# Patient Record
Sex: MALE | Race: WHITE | NOT HISPANIC OR LATINO | Employment: OTHER | URBAN - METROPOLITAN AREA
[De-identification: names, ages, dates, MRNs, and addresses within clinical notes are randomized per-mention and may not be internally consistent; named-entity substitution may affect disease eponyms.]

---

## 2019-07-10 ENCOUNTER — TELEPHONE (OUTPATIENT)
Dept: GASTROENTEROLOGY | Facility: CLINIC | Age: 79
End: 2019-07-10

## 2019-08-01 NOTE — TELEPHONE ENCOUNTER
Dr Harriett Huynh cannot reach this pt by phone  No current number is available-there has been no response from recall letter   Please advise-thank you

## 2020-03-02 ENCOUNTER — OFFICE VISIT (OUTPATIENT)
Dept: GASTROENTEROLOGY | Facility: CLINIC | Age: 80
End: 2020-03-02
Payer: COMMERCIAL

## 2020-03-02 VITALS
WEIGHT: 203 LBS | SYSTOLIC BLOOD PRESSURE: 118 MMHG | HEIGHT: 71 IN | HEART RATE: 98 BPM | BODY MASS INDEX: 28.42 KG/M2 | DIASTOLIC BLOOD PRESSURE: 82 MMHG

## 2020-03-02 DIAGNOSIS — Z12.11 SCREENING FOR COLON CANCER: Primary | ICD-10-CM

## 2020-03-02 DIAGNOSIS — R10.31 RIGHT LOWER QUADRANT ABDOMINAL PAIN: ICD-10-CM

## 2020-03-02 DIAGNOSIS — R19.4 CHANGE IN BOWEL HABITS: ICD-10-CM

## 2020-03-02 DIAGNOSIS — K62.89 RECTAL DISCOMFORT: ICD-10-CM

## 2020-03-02 DIAGNOSIS — R10.32 LEFT LOWER QUADRANT ABDOMINAL PAIN: ICD-10-CM

## 2020-03-02 PROCEDURE — 99204 OFFICE O/P NEW MOD 45 MIN: CPT | Performed by: NURSE PRACTITIONER

## 2020-03-02 RX ORDER — LOSARTAN POTASSIUM 25 MG/1
25 TABLET ORAL DAILY
COMMUNITY

## 2020-03-02 NOTE — PROGRESS NOTES
[id number] - Outpatient Consultation  Veda Meade 70 y o  male MRN: [id number]  Encounter: [id number]    ASSESSMENT AND PLAN:      1  Screening for colon cancer    Advise a screening colonoscopy now as prior colonoscopy was performed July 2009   10 year recall advised  - will arrange for a colonoscopy    2  Change in bowel habits  Change in bowel habits characterized by alternating episodes of more frequent stools and constipation  This has been ongoing for last couple months and tends to be episodic  Also notes a new onset of lower abdominal pain that also tends to be episodic  Associated with bloating  Recent bloodwork showed TSH to be within normal range  Increase water intake and fiber   take 2 fiber pills a day    3  Right lower quadrant abdominal pain  Right and left lower quadrant abdominal pain over the past couple months associated with a change in bowel habits characterized by more frequent stools and then periods of constipation  Unclear if this represents a functional component  Unable to exclude colitis or less likely Crohn's disease  Unlikely an appendicitis  Recent blood work showed CBC and CMP to be within normal range  4  Left lower quadrant abdominal pain  Exclude diverticular disease/diverticulitis, colitis or colon polyp/colorectal neoplasm  Consider a urological etiology as he believes he had BPH in the past     - BUN/Creatinine Ratio; Future  - CT abdomen pelvis w contrast; Future  - arrange for colonoscopy after CT scan of the abdomen pelvis has been reviewed      5  Rectal discomfort  Has been experiencing some rectal discomfort over the past couple of months  Denies actual pruritus, pain or rectal bleeding  No obvious external or internal hemorrhoids or fissures noted on today's examination but suspect hemorrhoids      - advised that he increase fluid and fiber intake daily fiber supplementation    Followup Appointment:  4-6 weeks  ______________________________________________________________________    Chief Complaint   Patient presents with    Change in bowels, abd  discomfort     Due for colonoscopy       HPI:   Truman Jones is a 70y o  year old male who presents for upcoming screening colonoscopy as his last colonoscopy was in 2009  The only findings at that time were sigmoid diverticulosis, internal hemorrhoids and hyperplastic colon polyp  S by more frequent stools alternating with episodes of constipation  Associated with lower abdominal pain  Food nor movement worsens  Sitting for long periods of time can aggravate the lower abdominal pain but not necessarily always  He does not necessarily feel improvement following a bowel movement associated postprandial bloating  Denies any urinary difficulties or hematuria  Denies rectal bleeding, melena, fevers, chills or vomiting  Occasional nausea  Increased gaseousness  He has lost approximately 5 lbs over the past couple of weeks  He also reports some rectal discomfort over the same time frame  Denies actual rectal pain, pruritus or rectal bleeding  Historical Information   Past Medical History:   Diagnosis Date    Melanoma [facility])     Underneath foot     Past Surgical History:   Procedure Laterality Date    COLONOSCOPY  07/31/2009    COLONOSCOPY  07/31/2009    Hyperplastic colon polyp, mild diverticulosis in the sigmoid and internal hemorrhoids  Ten year recall advised       Social History     Substance and Sexual Activity   Alcohol Use Yes    Frequency: 4 or more times a week     Social History     Substance and Sexual Activity   Drug Use Not on file     Social History     Tobacco Use   Smoking Status Never Smoker   Smokeless Tobacco Never Used     Family History   Problem Relation Age of Onset    Colon polyps Neg Hx     Colon cancer Neg Hx        Meds/Allergies     Current Outpatient Medications:     losartan (COZAAR) 25 mg tablet    No Known Allergies    PHYSICAL EXAM:    Blood pressure 118/82, pulse 98, height 5' 11" (1 803 m), weight 92 1 kg (203 lb)  Body mass index is 28 31 kg/m²  General Appearance: NAD, cooperative, alert  Eyes: Anicteric  ENT:  Normocephalic, atraumatic, normal mucosa  Neck:  Supple, symmetrical, trachea midline,   Resp:  Clear to auscultation bilaterally; no rales, rhonchi or wheezing; respirations unlabored   CV:  S1 S2, Regular rate and rhythm; no murmur, rub, or gallop  GI:  Soft, tender in right lower quadrant and left lower quadrant, no rebound or guarding, non-distended; normal bowel sounds; no masses, no organomegaly   Rectal:  Heme-negative brown stool  No external hemorrhoids or internal hemorrhoids noted or fissures  Musculoskeletal: No cyanosis, clubbing or edema  Normal ROM  Skin:  No jaundice, rashes, or lesions   Heme/Lymph: No palpable cervical lymphadenopathy  Psych: Normal affect, good eye contact  Neuro: No gross deficits, AAOx3    Lab Results:   No results found for: WBC, HGB, HCT, MCV, PLT  No results found for: NA, K, CL, CO2, ANIONGAP, BUN, CREATININE, GLUCOSE, GLUF, CALCIUM, CORRECTEDCA, AST, ALT, ALKPHOS, PROT, BILITOT, EGFR  No results found for: IRON, TIBC, FERRITIN  No results found for: LIPASE    Radiology Results:   No results found  REVIEW OF SYSTEMS:    CONSTITUTIONAL: Denies any fever, chills, rigors, and weight loss  Admits to fatigue and weight loss  HEENT: No earache or tinnitus  Denies hearing loss or visual disturbances  CARDIOVASCULAR: No chest pain or palpitations  RESPIRATORY: Denies any cough, hemoptysis, shortness of breath or dyspnea on exertion  GASTROINTESTINAL: As noted in the History of Present Illness  GENITOURINARY: No problems with urination  Denies any hematuria or dysuria  NEUROLOGIC: No dizziness or vertigo, denies headaches  Admits to loss of strength    MUSCULOSKELETAL:  Admits to painful joints, swollen joints, joint stiffness, muscle aches and chronic pain   SKIN:  Admits to itching and discoloration  ENDOCRINE: Denies excessive thirst  Denies intolerance to heat or cold  PSYCHOSOCIAL: Denies depression or anxiety  Denies any recent memory loss

## 2020-03-02 NOTE — PATIENT INSTRUCTIONS
Increase water intake and fiber   take 2 fiber pills a day   check CT scan the abdomen/pelvis   arrange for a colonoscopy

## 2020-03-03 ENCOUNTER — TELEPHONE (OUTPATIENT)
Dept: GASTROENTEROLOGY | Facility: CLINIC | Age: 80
End: 2020-03-03

## 2020-03-03 DIAGNOSIS — R19.4 CHANGE IN BOWEL HABITS: Primary | ICD-10-CM

## 2020-03-03 NOTE — TELEPHONE ENCOUNTER
Can you please call patient as he has multiple questions about CT scan and blood work before the CT scan    BUN creatinine sent to [facility],

## 2020-03-03 NOTE — TELEPHONE ENCOUNTER
Patient called via the service  Has questions about yesterday's office visit   Would like a call back [phone]   Thank you

## 2020-03-06 ENCOUNTER — TELEPHONE (OUTPATIENT)
Dept: GASTROENTEROLOGY | Facility: CLINIC | Age: 80
End: 2020-03-06

## 2020-03-06 LAB
BUN SERPL-MCNC: 17 MG/DL (ref 8–27)
BUN/CREAT SERPL: 15 (ref 10–24)
CREAT SERPL-MCNC: 1.17 MG/DL (ref 0.76–1.27)
SL AMB EGFR AFRICAN AMERICAN: 69 ML/MIN/1.73
SL AMB EGFR NON AFRICAN AMERICAN: 60 ML/MIN/1.73

## 2020-03-09 NOTE — TELEPHONE ENCOUNTER
Called and spoke to pharmacist, Eli Boone  Okay to substitute generic  She understood and will fill Golytely for pt

## 2020-03-24 ENCOUNTER — TELEPHONE (OUTPATIENT)
Dept: GASTROENTEROLOGY | Facility: CLINIC | Age: 80
End: 2020-03-24

## 2020-03-24 NOTE — TELEPHONE ENCOUNTER
Dr Nancie Ayala I called pt to cancel proc scheduled for 4/1/20 an endo-pt stated his ct results came back normal and due to him being 68 he does not want to schedule any further colonoscopies

## 2020-03-25 NOTE — TELEPHONE ENCOUNTER
Okay to cancel screening colonoscopy given age of 68 and average risk  Advise fiber and fluid is recommended by nurse practitioner at office visit  Glad CT scan was negative, but NP order that because of his symptoms not for screening  Should discuss with his PCP when he is next seen whether any additional screening should be done  If desired in the future can always reschedule screening colonoscopy

## 2020-03-25 NOTE — TELEPHONE ENCOUNTER
I called pt to offer virtual visit  Pt declined, not comfortable using technology  Please advise if pt can have a phone interview or keep o/v as is     ce

## 2020-03-25 NOTE — TELEPHONE ENCOUNTER
Agree that okay to cancel colonoscopy  I would like to have a virtual visit with him on 04/16 and cancel his actual appointment    Could you please arrange for this, thank you

## 2020-03-25 NOTE — TELEPHONE ENCOUNTER
Spoke to pt/tm  Pt would like to cx appt all together, no longer having pain    Ok per tm for pt to cx if he's not having any pain      ce

## 2021-03-18 ENCOUNTER — IMMUNIZATIONS (OUTPATIENT)
Dept: FAMILY MEDICINE CLINIC | Facility: HOSPITAL | Age: 81
End: 2021-03-18

## 2021-03-18 DIAGNOSIS — Z23 ENCOUNTER FOR IMMUNIZATION: Primary | ICD-10-CM

## 2021-03-18 PROCEDURE — 91300 SARS-COV-2 / COVID-19 MRNA VACCINE (PFIZER-BIONTECH) 30 MCG: CPT

## 2021-03-18 PROCEDURE — 0001A SARS-COV-2 / COVID-19 MRNA VACCINE (PFIZER-BIONTECH) 30 MCG: CPT

## 2021-04-09 ENCOUNTER — IMMUNIZATIONS (OUTPATIENT)
Dept: FAMILY MEDICINE CLINIC | Facility: HOSPITAL | Age: 81
End: 2021-04-09

## 2021-04-09 DIAGNOSIS — Z23 ENCOUNTER FOR IMMUNIZATION: Primary | ICD-10-CM

## 2021-04-09 PROCEDURE — 91300 SARS-COV-2 / COVID-19 MRNA VACCINE (PFIZER-BIONTECH) 30 MCG: CPT

## 2021-04-09 PROCEDURE — 0002A SARS-COV-2 / COVID-19 MRNA VACCINE (PFIZER-BIONTECH) 30 MCG: CPT

## 2023-07-28 ENCOUNTER — APPOINTMENT (EMERGENCY)
Dept: CT IMAGING | Facility: HOSPITAL | Age: 83
DRG: 312 | End: 2023-07-28
Payer: COMMERCIAL

## 2023-07-28 ENCOUNTER — HOSPITAL ENCOUNTER (INPATIENT)
Facility: HOSPITAL | Age: 83
LOS: 2 days | Discharge: HOME | DRG: 312 | End: 2023-07-30
Attending: EMERGENCY MEDICINE | Admitting: INTERNAL MEDICINE
Payer: COMMERCIAL

## 2023-07-28 DIAGNOSIS — E23.6 ENLARGED PITUITARY GLAND (HCC): ICD-10-CM

## 2023-07-28 DIAGNOSIS — D35.2 PITUITARY MACROADENOMA (HCC): ICD-10-CM

## 2023-07-28 DIAGNOSIS — E87.1 HYPONATREMIA: ICD-10-CM

## 2023-07-28 DIAGNOSIS — E23.6 PITUITARY GLAND ENLARGED (HCC): ICD-10-CM

## 2023-07-28 DIAGNOSIS — R55 SYNCOPE: Primary | ICD-10-CM

## 2023-07-28 PROBLEM — Z86.79 HISTORY OF HYPERTENSION: Chronic | Status: ACTIVE | Noted: 2023-07-28

## 2023-07-28 PROBLEM — Z85.820 HISTORY OF MELANOMA: Chronic | Status: ACTIVE | Noted: 2023-07-28

## 2023-07-28 PROBLEM — E03.9 HYPOTHYROIDISM: Chronic | Status: ACTIVE | Noted: 2023-07-28

## 2023-07-28 LAB
ALBUMIN SERPL BCP-MCNC: 4.3 G/DL (ref 3.5–5)
ALP SERPL-CCNC: 57 U/L (ref 34–104)
ALT SERPL W P-5'-P-CCNC: 8 U/L (ref 7–52)
ANION GAP SERPL CALCULATED.3IONS-SCNC: 5 MMOL/L
AST SERPL W P-5'-P-CCNC: 19 U/L (ref 13–39)
ATRIAL RATE: 66 BPM
BASOPHILS # BLD AUTO: 0.05 THOUSANDS/ÂΜL (ref 0–0.1)
BASOPHILS NFR BLD AUTO: 1 % (ref 0–1)
BILIRUB SERPL-MCNC: 0.5 MG/DL (ref 0.2–1)
BUN SERPL-MCNC: 17 MG/DL (ref 5–25)
CALCIUM SERPL-MCNC: 9.4 MG/DL (ref 8.4–10.2)
CARDIAC TROPONIN I PNL SERPL HS: <2 NG/L
CARDIAC TROPONIN I PNL SERPL HS: <2 NG/L
CHLORIDE SERPL-SCNC: 95 MMOL/L (ref 96–108)
CO2 SERPL-SCNC: 29 MMOL/L (ref 21–32)
CREAT SERPL-MCNC: 1.02 MG/DL (ref 0.6–1.3)
EOSINOPHIL # BLD AUTO: 0.45 THOUSAND/ÂΜL (ref 0–0.61)
EOSINOPHIL NFR BLD AUTO: 6 % (ref 0–6)
ERYTHROCYTE [DISTWIDTH] IN BLOOD BY AUTOMATED COUNT: 12.2 % (ref 11.6–15.1)
GFR SERPL CREATININE-BSD FRML MDRD: 68 ML/MIN/1.73SQ M
GLUCOSE SERPL-MCNC: 100 MG/DL (ref 65–140)
GLUCOSE SERPL-MCNC: 112 MG/DL (ref 65–140)
HCT VFR BLD AUTO: 39 % (ref 36.5–49.3)
HGB BLD-MCNC: 13.5 G/DL (ref 12–17)
IMM GRANULOCYTES # BLD AUTO: 0.02 THOUSAND/UL (ref 0–0.2)
IMM GRANULOCYTES NFR BLD AUTO: 0 % (ref 0–2)
LYMPHOCYTES # BLD AUTO: 2.54 THOUSANDS/ÂΜL (ref 0.6–4.47)
LYMPHOCYTES NFR BLD AUTO: 34 % (ref 14–44)
MCH RBC QN AUTO: 29.2 PG (ref 26.8–34.3)
MCHC RBC AUTO-ENTMCNC: 34.6 G/DL (ref 31.4–37.4)
MCV RBC AUTO: 84 FL (ref 82–98)
MONOCYTES # BLD AUTO: 0.42 THOUSAND/ÂΜL (ref 0.17–1.22)
MONOCYTES NFR BLD AUTO: 6 % (ref 4–12)
NEUTROPHILS # BLD AUTO: 4 THOUSANDS/ÂΜL (ref 1.85–7.62)
NEUTS SEG NFR BLD AUTO: 53 % (ref 43–75)
NRBC BLD AUTO-RTO: 0 /100 WBCS
P AXIS: 45 DEGREES
PLATELET # BLD AUTO: 262 THOUSANDS/UL (ref 149–390)
PMV BLD AUTO: 9 FL (ref 8.9–12.7)
POTASSIUM SERPL-SCNC: 4.1 MMOL/L (ref 3.5–5.3)
PR INTERVAL: 210 MS
PROT SERPL-MCNC: 7 G/DL (ref 6.4–8.4)
QRS AXIS: 2 DEGREES
QRSD INTERVAL: 86 MS
QT INTERVAL: 430 MS
QTC INTERVAL: 450 MS
RBC # BLD AUTO: 4.62 MILLION/UL (ref 3.88–5.62)
SODIUM SERPL-SCNC: 129 MMOL/L (ref 135–147)
T WAVE AXIS: 0 DEGREES
VENTRICULAR RATE: 66 BPM
WBC # BLD AUTO: 7.48 THOUSAND/UL (ref 4.31–10.16)

## 2023-07-28 PROCEDURE — 70496 CT ANGIOGRAPHY HEAD: CPT

## 2023-07-28 PROCEDURE — 99284 EMERGENCY DEPT VISIT MOD MDM: CPT

## 2023-07-28 PROCEDURE — 93010 ELECTROCARDIOGRAM REPORT: CPT | Performed by: INTERNAL MEDICINE

## 2023-07-28 PROCEDURE — 82948 REAGENT STRIP/BLOOD GLUCOSE: CPT

## 2023-07-28 PROCEDURE — 99285 EMERGENCY DEPT VISIT HI MDM: CPT | Performed by: PHYSICIAN ASSISTANT

## 2023-07-28 PROCEDURE — 93005 ELECTROCARDIOGRAM TRACING: CPT

## 2023-07-28 PROCEDURE — 99222 1ST HOSP IP/OBS MODERATE 55: CPT | Performed by: PSYCHIATRY & NEUROLOGY

## 2023-07-28 PROCEDURE — 96361 HYDRATE IV INFUSION ADD-ON: CPT

## 2023-07-28 PROCEDURE — 96360 HYDRATION IV INFUSION INIT: CPT

## 2023-07-28 PROCEDURE — 85025 COMPLETE CBC W/AUTO DIFF WBC: CPT | Performed by: PHYSICIAN ASSISTANT

## 2023-07-28 PROCEDURE — 99447 NTRPROF PH1/NTRNET/EHR 11-20: CPT | Performed by: INTERNAL MEDICINE

## 2023-07-28 PROCEDURE — 36415 COLL VENOUS BLD VENIPUNCTURE: CPT | Performed by: PHYSICIAN ASSISTANT

## 2023-07-28 PROCEDURE — 1123F ACP DISCUSS/DSCN MKR DOCD: CPT | Performed by: PHYSICIAN ASSISTANT

## 2023-07-28 PROCEDURE — 70498 CT ANGIOGRAPHY NECK: CPT

## 2023-07-28 PROCEDURE — 99222 1ST HOSP IP/OBS MODERATE 55: CPT | Performed by: INTERNAL MEDICINE

## 2023-07-28 PROCEDURE — 84484 ASSAY OF TROPONIN QUANT: CPT | Performed by: PHYSICIAN ASSISTANT

## 2023-07-28 PROCEDURE — 80053 COMPREHEN METABOLIC PANEL: CPT | Performed by: PHYSICIAN ASSISTANT

## 2023-07-28 RX ORDER — LEVOTHYROXINE SODIUM 0.03 MG/1
25 TABLET ORAL
Status: DISCONTINUED | OUTPATIENT
Start: 2023-07-29 | End: 2023-07-29

## 2023-07-28 RX ORDER — HEPARIN SODIUM 5000 [USP'U]/ML
5000 INJECTION, SOLUTION INTRAVENOUS; SUBCUTANEOUS EVERY 8 HOURS SCHEDULED
Status: DISCONTINUED | OUTPATIENT
Start: 2023-07-28 | End: 2023-07-30 | Stop reason: HOSPADM

## 2023-07-28 RX ORDER — LORAZEPAM 2 MG/ML
1 INJECTION INTRAMUSCULAR
Status: ACTIVE | OUTPATIENT
Start: 2023-07-29 | End: 2023-07-29

## 2023-07-28 RX ORDER — LEVOTHYROXINE SODIUM 0.03 MG/1
75 TABLET ORAL DAILY
COMMUNITY
End: 2023-08-03

## 2023-07-28 RX ADMIN — HEPARIN SODIUM 5000 UNITS: 5000 INJECTION INTRAVENOUS; SUBCUTANEOUS at 15:09

## 2023-07-28 RX ADMIN — IOHEXOL 85 ML: 350 INJECTION, SOLUTION INTRAVENOUS at 13:09

## 2023-07-28 RX ADMIN — HEPARIN SODIUM 5000 UNITS: 5000 INJECTION INTRAVENOUS; SUBCUTANEOUS at 21:42

## 2023-07-28 RX ADMIN — SODIUM CHLORIDE 1000 ML: 0.9 INJECTION, SOLUTION INTRAVENOUS at 09:57

## 2023-07-28 NOTE — PLAN OF CARE
Problem: PAIN - ADULT  Goal: Verbalizes/displays adequate comfort level or baseline comfort level  Description: Interventions:  - Encourage patient to monitor pain and request assistance  - Assess pain using appropriate pain scale  - Administer analgesics based on type and severity of pain and evaluate response  - Implement non-pharmacological measures as appropriate and evaluate response  - Consider cultural and social influences on pain and pain management  - Notify physician/advanced practitioner if interventions unsuccessful or patient reports new pain  Outcome: Progressing     Problem: INFECTION - ADULT  Goal: Absence or prevention of progression during hospitalization  Description: INTERVENTIONS:  - Assess and monitor for signs and symptoms of infection  - Monitor lab/diagnostic results  - Monitor all insertion sites, i.e. indwelling lines, tubes, and drains  - Monitor endotracheal if appropriate and nasal secretions for changes in amount and color  - [place] appropriate cooling/warming therapies per order  - Administer medications as ordered  - Instruct and encourage patient and family to use good hand hygiene technique  - Identify and instruct in appropriate isolation precautions for identified infection/condition  Outcome: Progressing

## 2023-07-28 NOTE — ED PROVIDER NOTES
History  Chief Complaint   Patient presents with   • Syncope     Pt was sitting at desk when he said " I cant see the screen" and passed out per wife. Pt did not fall. Pt was out for about 5minutes and would not arise per wife. Pt denies any complaints. HPI     81 Y/O M presents to ED for syncopal episode at home that happened at approx 8 AM.  Pt and wife state that he was sitting at his computer when all of a sudden he stated that he could not see the screen. He then lost consciousness for approx 3-4 min per wife. She states his eyes were open but he was not responding. She also states and denies any neuromuscular activity during that time. She denies any focal weakness, or seizure like activity. Jc Dus, himself, denies any chest pain or dyspnea or diaphoresis. No prior similar episodes and non recurrent. He states when he came to he had no dizziness or HA, CP or SOB. He also states that thereafter he did not have any vision issues. PMHX HTN, Hypothyroidism, Constpation    Prior to Admission Medications   Prescriptions Last Dose Informant Patient Reported? Taking?   levothyroxine 25 mcg tablet 7/28/2023  Yes Yes   Sig: Take 25 mcg by mouth daily   losartan (COZAAR) 25 mg tablet Not Taking Self Yes No   Sig: Take 25 mg by mouth daily   Patient not taking: Reported on 7/28/2023   polyethylene glycol (COLYTE) 4000 mL solution   No No   Sig: Take 4,000 mL by mouth once for 1 dose      Facility-Administered Medications: None       Past Medical History:   Diagnosis Date   • Disease of thyroid gland    • Melanoma ([address])     Underneath foot       Past Surgical History:   Procedure Laterality Date   • COLONOSCOPY  07/31/2009   • COLONOSCOPY  07/31/2009    Hyperplastic colon polyp, mild diverticulosis in the sigmoid and internal hemorrhoids. Ten year recall advised.        Family History   Problem Relation Age of Onset   • Colon polyps Neg Hx    • Colon cancer Neg Hx      I have reviewed and agree with the history as documented. E-Cigarette/Vaping   • E-Cigarette Use Never User      E-Cigarette/Vaping Substances   • Nicotine No    • THC No    • CBD No    • Flavoring No    • Other No    • Unknown No      Social History     Tobacco Use   • Smoking status: Never   • Smokeless tobacco: Never   Vaping Use   • Vaping Use: Never used   Substance Use Topics   • Alcohol use: Yes       Review of Systems   Constitutional: Negative for chills and fever. HENT: Negative for ear pain and sore throat. Eyes: Positive for visual disturbance (none current). Negative for pain. Respiratory: Negative for cough, chest tightness, shortness of breath and stridor. Cardiovascular: Negative for chest pain and palpitations. Gastrointestinal: Negative for abdominal pain and vomiting. Genitourinary: Negative for dysuria and hematuria. Musculoskeletal: Negative for arthralgias and back pain. Skin: Negative for color change and rash. Neurological: Positive for syncope. Negative for dizziness, seizures, facial asymmetry, light-headedness, numbness and headaches. All other systems reviewed and are negative. Physical Exam  Physical Exam  Vitals and nursing note reviewed. Constitutional:       General: He is not in acute distress. Appearance: He is well-developed. HENT:      Head: Normocephalic and atraumatic. Eyes:      General: No visual field deficit. Conjunctiva/sclera: Conjunctivae normal.   Cardiovascular:      Rate and Rhythm: Normal rate and regular rhythm. Heart sounds: No murmur heard. Pulmonary:      Effort: Pulmonary effort is normal. No respiratory distress. Breath sounds: Normal breath sounds. Abdominal:      Palpations: Abdomen is soft. Tenderness: There is no abdominal tenderness. Musculoskeletal:         General: No swelling. Cervical back: Neck supple. Skin:     General: Skin is warm and dry. Capillary Refill: Capillary refill takes less than 2 seconds.    Neurological: Mental Status: He is alert and oriented to person, place, and time. GCS: GCS eye subscore is 4. GCS verbal subscore is 5. GCS motor subscore is 6. Cranial Nerves: No cranial nerve deficit, dysarthria or facial asymmetry. Sensory: Sensation is intact. No sensory deficit. Motor: Motor function is intact. No weakness, tremor, atrophy, abnormal muscle tone, seizure activity or pronator drift. Coordination: Romberg sign negative. Coordination normal. Finger-Nose-Finger Test and Heel to [facility] Test normal. Rapid alternating movements normal.      Gait: Gait and tandem walk normal.      Deep Tendon Reflexes:      Reflex Scores:       Bicep reflexes are 2+ on the right side and 2+ on the left side. Patellar reflexes are 2+ on the right side and 2+ on the left side.   Psychiatric:         Mood and Affect: Mood normal.         Vital Signs  ED Triage Vitals   Temperature Pulse Respirations Blood Pressure SpO2   07/28/23 0945 07/28/23 0938 07/28/23 0938 07/28/23 0938 07/28/23 0938   97.8 °F (36.6 °C) 70 18 156/81 98 %      Temp Source Heart Rate Source Patient Position - Orthostatic VS BP Location FiO2 (%)   07/28/23 0945 07/28/23 1200 07/28/23 1423 07/28/23 1423 --   Oral Monitor Lying Right arm       Pain Score       07/28/23 1518       No Pain           Vitals:    07/28/23 1423 07/28/23 1424 07/28/23 1425 07/28/23 1518   BP: 122/80 123/78 129/67 136/88   Pulse: 80 80 85 72   Patient Position - Orthostatic VS: Lying Sitting - Orthostatic VS Standing - Orthostatic VS          Visual Acuity      ED Medications  Medications   levothyroxine tablet 25 mcg (has no administration in time range)   heparin (porcine) subcutaneous injection 5,000 Units (5,000 Units Subcutaneous Given 7/28/23 1509)   LORazepam (ATIVAN) injection 1 mg (has no administration in time range)   sodium chloride 0.9 % bolus 1,000 mL (0 mL Intravenous Stopped 7/28/23 1201)   iohexol (OMNIPAQUE) 350 MG/ML injection (SINGLE-DOSE) 100 mL (85 mL Intravenous Given 7/28/23 1309)       Diagnostic Studies  Results Reviewed     Procedure Component Value Units Date/Time    HS Troponin I 2hr [[id number]] Collected: 07/28/23 1201    Lab Status: Final result Specimen: Blood from Arm, Left Updated: 07/28/23 1241     hs TnI 2hr <2 ng/L      Delta 2hr hsTnI --    HS Troponin 0hr (reflex protocol) [[id number]]  (Normal) Collected: 07/28/23 0950    Lab Status: Final result Specimen: Blood from Arm, Left Updated: 07/28/23 1024     hs TnI 0hr <2 ng/L     Comprehensive metabolic panel [[id number]]  (Abnormal) Collected: 07/28/23 0950    Lab Status: Final result Specimen: Blood from Arm, Left Updated: 07/28/23 1020     Sodium 129 mmol/L      Potassium 4.1 mmol/L      Chloride 95 mmol/L      CO2 29 mmol/L      ANION GAP 5 mmol/L      BUN 17 mg/dL      Creatinine 1.02 mg/dL      Glucose 100 mg/dL      Calcium 9.4 mg/dL      AST 19 U/L      ALT 8 U/L      Alkaline Phosphatase 57 U/L      Total Protein 7.0 g/dL      Albumin 4.3 g/dL      Total Bilirubin 0.50 mg/dL      eGFR 68 ml/min/1.73sq m     Narrative:      [place] guidelines for Chronic Kidney Disease (CKD):   •  Stage 1 with normal or high GFR (GFR > 90 mL/min/1.73 square meters)  •  Stage 2 Mild CKD (GFR = 60-89 mL/min/1.73 square meters)  •  Stage 3A Moderate CKD (GFR = 45-59 mL/min/1.73 square meters)  •  Stage 3B Moderate CKD (GFR = 30-44 mL/min/1.73 square meters)  •  Stage 4 Severe CKD (GFR = 15-29 mL/min/1.73 square meters)  •  Stage 5 End Stage CKD (GFR <15 mL/min/1.73 square meters)  Note: GFR calculation is accurate only with a steady state creatinine    CBC and differential [[id number]] Collected: 07/28/23 0950    Lab Status: Final result Specimen: Blood from Arm, Left Updated: 07/28/23 1001     WBC 7.48 Thousand/uL      RBC 4.62 Million/uL      Hemoglobin 13.5 g/dL      Hematocrit 39.0 %      MCV 84 fL      MCH 29.2 pg      MCHC 34.6 g/dL      RDW 12.2 %      MPV 9.0 fL Platelets 897 Thousands/uL      nRBC 0 /100 WBCs      Neutrophils Relative 53 %      Immat GRANS % 0 %      Lymphocytes Relative 34 %      Monocytes Relative 6 %      Eosinophils Relative 6 %      Basophils Relative 1 %      Neutrophils Absolute 4.00 Thousands/µL      Immature Grans Absolute 0.02 Thousand/uL      Lymphocytes Absolute 2.54 Thousands/µL      Monocytes Absolute 0.42 Thousand/µL      Eosinophils Absolute 0.45 Thousand/µL      Basophils Absolute 0.05 Thousands/µL     Fingerstick Glucose (POCT) [[id number]]  (Normal) Collected: 07/28/23 0951    Lab Status: Final result Updated: 07/28/23 0952     POC Glucose 112 mg/dl                  CTA head and neck with and without contrast   Final Result by Alisson Wright MD (07/28 1334)      No acute infarct or intracranial hemorrhage. Mildly enlarged pituitary gland with thickened pituitary stalk. Differential includes hypophysitis, pituitary macroadenoma, and less likely metastasis (given history of melanoma). Recommend correlation with pituitary hormone levels and review of    medication history. Recommend follow-up MRI brain pituitary protocol with and without contrast.      Negative CTA head and neck for large vessel occlusion, dissection, aneurysm, or high-grade stenosis. Additional chronic/incidental findings as detailed above. The study was marked in [facility] for immediate notification.                   Workstation performed: [id number]         MRI inpatient order    (Results Pending)              Procedures  ECG 12 Lead Documentation Only    Date/Time: 7/28/2023 9:43 AM    Performed by: Angelique Kimball PA-C  Authorized by: Angelique Kimball PA-C    Indications / Diagnosis:  Syncope  ECG reviewed by me, the ED Provider: yes    Patient location:  ED  Previous ECG:     Comparison to cardiac monitor: Yes    Interpretation:     Interpretation: normal    Rate:     ECG rate:  66    ECG rate assessment: normal    Rhythm:     Rhythm: sinus rhythm    Ectopy:     Ectopy: none    QRS:     QRS axis:  Normal  Conduction:     Conduction: normal    ST segments:     ST segments:  Non-specific    Depression:  II, III and aVF  T waves:     T waves: normal    Comments:      Self interpreted by me. ED Course  ED Course as of 07/28/23 2134 Fri Jul 28, 2023   1000 Work-up initiated. 1126 CTA head and neck pending     1141 Paged medicine for admission. 1314 Paged medicine for admission. 1333 Patient admitted. SBIRT 20yo+    Flowsheet Row Most Recent Value   Initial Alcohol Screen: US AUDIT-C     1. How often do you have a drink containing alcohol? 0 Filed at: 07/28/2023 1206   2. How many drinks containing alcohol do you have on a typical day you are drinking? 0 Filed at: 07/28/2023 1206   3a. Male UNDER 65: How often do you have five or more drinks on one occasion? 0 Filed at: 07/28/2023 1206   3b. FEMALE Any Age, or MALE 65+: How often do you have 4 or more drinks on one occassion? 0 Filed at: 07/28/2023 1206   Audit-C Score 0 Filed at: 07/28/2023 1206   [place]: How many times in the past year have you. .. Used an illegal drug or used a prescription medication for non-medical reasons? Never Filed at: 07/28/2023 1206                    Medical Decision Making  Work up initiated and patient admitted d/t elderly and need for full syncope w/u w/ echo and full ACS r/o. EKG wnl. Initial troponin levels WNL. Pt is also hyponatremic with serum  as possible contributor. DDX CVA, Ventricular arrhythmia, CVA, Sz, Vasovgal v. Cardiac syncope. Admitted for further evaluation and w/u. No prolonged QT    Hyponatremia: acute illness or injury  Syncope: acute illness or injury  Amount and/or Complexity of Data Reviewed  Labs: ordered. Radiology: ordered. Risk  Prescription drug management. Decision regarding hospitalization.           Disposition  Final diagnoses:   Syncope   Hyponatremia     Time reflects when diagnosis was documented in both MDM as applicable and the Disposition within this note     Time User Action Codes Description Comment    7/28/2023 10:48 AM Jayda Quiros Add [R55] Syncope     7/28/2023 10:48 AM Jayda Quiros Add [E87.1] Hyponatremia     7/28/2023  2:15 PM Evertorrey Bonds Add [E23.6] Pituitary gland enlarged [facility])       ED Disposition     ED Disposition   Admit    Condition   Stable    Date/Time   Fri Jul 28, 2023  1:18 PM    Comment   Case was discussed with DR. Amie Bryant and the patient's admission status was agreed to be Admission Status: observation status to the service of Dr. Amie Bryant . Follow-up Information    None         Current Discharge Medication List      CONTINUE these medications which have NOT CHANGED    Details   levothyroxine 25 mcg tablet Take 25 mcg by mouth daily      losartan (COZAAR) 25 mg tablet Take 25 mg by mouth daily      polyethylene glycol (COLYTE) 4000 mL solution Take 4,000 mL by mouth once for 1 dose  Qty: 4000 mL, Refills: 0    Associated Diagnoses: Change in bowel habits             No discharge procedures on file.     PDMP Review     None          ED Provider  Electronically Signed by           Julius Espinosa PA-C  07/28/23 2147       Julius Espinosa PA-C  07/28/23 8113

## 2023-07-28 NOTE — ASSESSMENT & PLAN NOTE
· Was taken off medications few years ago when he lost weight BP as outpatient was acceptable off medications  · Monitor BP

## 2023-07-28 NOTE — H&P
[facility]  H&P  Name: Alice Reyes 82 y.o. male I MRN: [id number]  Unit/Bed#: -[facility] I Date of Admission: 7/28/2023   Date of Service: 7/28/2023 I Hospital Day: 0      Assessment/Plan   * Syncope  Assessment & Plan  · Syncopal event this morning at around 8 am while sitting at his desk lasting a few minutes preceded by blurred vision for 3-4 minutes   · CTA head and neck -  Mildly enlarged pituitary gland with thickened pituitary stalk. Differential includes hypophysitis, pituitary macroadenoma, and less likely metastasis (given history of melanoma). · Discussed with neurology - rule out cardiac etiology of syncope  · Check orthostatic vitals, echo, monitor on telemetry    Enlarged pituitary gland [facility])  Assessment & Plan  · Presentation as above  · CTA head and neck -with mildly enlarged pituitary gland with thickened pituitary stalk  · Per patient MRI brain done on 12/18/2012 at [facility] had shown mild prominence of pituitary gland  · Discussed with endocrine - MRI pituitary protocol, and anterior pituitary hormonal panel ordered  · Discussed with neurology - MRI pituitary protocol, consult neurosurgery    Hyponatremia  Assessment & Plan  · Na 129  · Received 1 L normal saline  · Recheck Na    Hypothyroidism  Assessment & Plan  · Continue levothyroxine at home dose of 25 mcg daily  · Pituitary work-up as above     History of melanoma  Assessment & Plan  · Melanoma at the bottom of his foot excised in 2007   · Follow-up MRI brain with contrast    History of hypertension  Assessment & Plan  · Was taken off medications few years ago when he lost weight BP as outpatient was acceptable off medications  · Monitor BP      VTE Pharmacologic Prophylaxis: VTE Score: 4 Moderate Risk (Score 3-4) - Pharmacological DVT Prophylaxis Ordered: heparin. Code Status: Level 1 - Full Code   Discussion with family: Updated  (wife) at bedside.     Anticipated Length of Stay: Patient will be admitted on an inpatient basis with an anticipated length of stay of greater than 2 midnights secondary to Work-up for syncope and pituitary enlargement. Total Time Spent on Date of Encounter in care of patient: 65 minutes This time was spent on one or more of the following: performing physical exam; counseling and coordination of care; obtaining or reviewing history; documenting in the medical record; reviewing/ordering tests, medications or procedures; communicating with other healthcare professionals and discussing with patient's family/caregivers. Chief Complaint: Syncope    History of Present Illness:  Gurmeet Mas is a 82 y.o. male with a PMH of hypothyroidism, melanoma who presents with syncope. He was working on his laptop at his desk at around 8 AM this morning when he felt blurry vision for 3 to 4 minutes. This was followed by a syncopal event witnessed by his wife lasting for 1 to 2 minutes. He has been constipated for the past 6 days and had a bowel movement last night after regimen prescribed by his primary care physician. No weakness or sensory symptoms. No nausea or vomiting. No abdominal pain. No fever or chills. No cough. No shortness of breath or chest pain. No palpitations or lightheadedness. Review of Systems:  Review of Systems   Eyes: Positive for visual disturbance. Gastrointestinal: Positive for constipation. Neurological: Positive for syncope. All other systems reviewed and are negative. Past Medical and Surgical History:   Past Medical History:   Diagnosis Date   • Disease of thyroid gland    • Melanoma ([address])     Underneath foot       Past Surgical History:   Procedure Laterality Date   • COLONOSCOPY  07/31/2009   • COLONOSCOPY  07/31/2009    Hyperplastic colon polyp, mild diverticulosis in the sigmoid and internal hemorrhoids. Ten year recall advised.        Meds/Allergies:  Prior to Admission medications    Medication Sig Start Date End Date Taking? Authorizing Provider   levothyroxine 25 mcg tablet Take 25 mcg by mouth daily   Yes Historical Provider, MD   losartan (COZAAR) 25 mg tablet Take 25 mg by mouth daily  Patient not taking: Reported on 7/28/2023    Historical Provider, MD   polyethylene glycol (COLYTE) 4000 mL solution Take 4,000 mL by mouth once for 1 dose 3/3/20 3/3/20  JOSE Martinez     I have reviewed home medications with patient personally. Allergies: No Known Allergies    Social History:  Marital Status: /Civil Union   Substance Use History:   Social History     Substance and Sexual Activity   Alcohol Use Yes     Social History     Tobacco Use   Smoking Status Never   Smokeless Tobacco Never     Social History     Substance and Sexual Activity   Drug Use Not on file       Family History:  Family History   Problem Relation Age of Onset   • Colon polyps Neg Hx    • Colon cancer Neg Hx        Physical Exam:     Vitals:   Blood Pressure: 136/88 (07/28/23 1518)  Pulse: 72 (07/28/23 1518)  Temperature: 98 °F (36.7 °C) (07/28/23 1518)  Temp Source: Oral (07/28/23 0945)  Respirations: 20 (07/28/23 1425)  SpO2: 92 % (07/28/23 1518)    Physical Exam  Vitals reviewed. HENT:      Nose: Nose normal.   Eyes:      Extraocular Movements: Extraocular movements intact. Cardiovascular:      Rate and Rhythm: Regular rhythm. Pulmonary:      Effort: Pulmonary effort is normal. No respiratory distress. Breath sounds: Normal breath sounds. No wheezing. Abdominal:      General: Bowel sounds are normal. There is no distension. Palpations: Abdomen is soft. Tenderness: There is no abdominal tenderness. Musculoskeletal:         General: No swelling. Skin:     General: Skin is warm and dry. Neurological:      General: No focal deficit present. Mental Status: He is alert and oriented to person, place, and time.    Psychiatric:         Mood and Affect: Mood normal.         Behavior: Behavior normal. Additional Data:     Lab Results:  Results from last 7 days   Lab Units 07/28/23  0950   WBC Thousand/uL 7.48   HEMOGLOBIN g/dL 13.5   HEMATOCRIT % 39.0   PLATELETS Thousands/uL 262   NEUTROS PCT % 53   LYMPHS PCT % 34   MONOS PCT % 6   EOS PCT % 6     Results from last 7 days   Lab Units 07/28/23  0950   SODIUM mmol/L 129*   POTASSIUM mmol/L 4.1   CHLORIDE mmol/L 95*   CO2 mmol/L 29   BUN mg/dL 17   CREATININE mg/dL 1.02   ANION GAP mmol/L 5   CALCIUM mg/dL 9.4   ALBUMIN g/dL 4.3   TOTAL BILIRUBIN mg/dL 0.50   ALK PHOS U/L 57   ALT U/L 8   AST U/L 19   GLUCOSE RANDOM mg/dL 100         Results from last 7 days   Lab Units 07/28/23  0951   POC GLUCOSE mg/dl 112               Lines/Drains:  Invasive Devices     Peripheral Intravenous Line  Duration           Peripheral IV 07/28/23 Right Antecubital <1 day                    Imaging: Reviewed radiology reports from this admission including: CT head  CTA head and neck with and without contrast   Final Result by Kristi Brothers MD (07/28 1334)      No acute infarct or intracranial hemorrhage. Mildly enlarged pituitary gland with thickened pituitary stalk. Differential includes hypophysitis, pituitary macroadenoma, and less likely metastasis (given history of melanoma). Recommend correlation with pituitary hormone levels and review of    medication history. Recommend follow-up MRI brain pituitary protocol with and without contrast.      Negative CTA head and neck for large vessel occlusion, dissection, aneurysm, or high-grade stenosis. Additional chronic/incidental findings as detailed above. The study was marked in [facility] for immediate notification. Workstation performed: [id number]         MRI inpatient order    (Results Pending)       EKG and Other Studies Reviewed on Admission:   · EKG: NSR. HR 66. First-degree AV block. ** Please Note: This note has been constructed using a voice recognition system.  **

## 2023-07-28 NOTE — CONSULTS
INTERPROFESSIONAL (PHONE) Jannie Goldstein 82 y.o. male MRN: [id number]  Encounter Date: 07/28/23      Reason for Consult / Principal Problem: pituitary abnormality     Consulting Provider: Esperanza Mc     Requesting Provider: Kurt De La Torre    82year-old male with history of hypothyroidism, melanoma s/p resection in 2007 who presents with an episode of loss of awareness/staring off into the distance for about a minute-episode happened at home when he was sitting in front of the computer and felt diaphoretic was not responding to his wife,, episode lasted about a minute and resolved spontaneously. As per discussion with the medical team he denies any headaches, loss of vision, no history of immune checkpoint inhibitor treatment for melanoma  Reported pituitary abnormality on imaging 10 years back    In evaluation in the ED he was not hypotensive  or bradycardic-lab testing showed sodium 129    ASSESSMENT:  Abnormal pituitary imaging/mildly enlarged pituitary gland with thickened pituitary stalk  Hypothyroidism  Hyponatremia  History of malignant melanoma-no treatment with immune check inhibitors    RECOMMENDATIONS:  Suggest pituitary dedicated imaging MRI brain with pituitary protocol-  Will check anterior pituitary hormonal panel including TSH, free T4, a.m. cortisol ACTH and IGF-I and prolactin. Further management will depend upon these results    Total time spent in review of data, discussion with requesting provider and rendering advice was 11-20 minutes, >50% of the total time devoted to medical consultative verbal/EMR discussion between providers. Written report will be generated in the EMR.

## 2023-07-28 NOTE — CONSULTS
Consultation - Neurology   Carolyn Swain 82 y.o. male MRN: [id number]  Unit/Bed#: ED 03 Encounter: [id number]    Assessment/Plan    82year-old with history as listed below, the patient presents with episode of loss of awareness/loc/staring off into the distance for about 1 minute. He was reported to be seated and only felt diaphoretic prior, no focal neurological symptoms associated with, no reported seizure like activity. He denied any postictal confusion, tongue biting or urinary incontinence. No reported palpitations or other prodrome. He did not fall out of his chair. Vital signs were stable on arrival his blood pressure was 156/81 mhg.   He was noted to have a sodium of 129 and received IV fluids. CTA of the head and neck was completed in the ED- No acute infarct or intracranial hemorrhage. Mildly enlarged pituitary gland with thickened pituitary stalk. Differential includes hypophysitis, pituitary macroadenoma, and less likely metastasis (given history of melanoma). Negative CTA head and neck for large vessel occlusion, dissection, aneurysm, or high-grade stenosis. The patient reports prior pituitary abnormality on imaging 10 years ago, no reported hx of headache, vision loss, or focal neurological symptoms. · Episode of loss of awareness/loc/staring off a distance for about 1 minute, with no seizure activity or postictal confusion. Unclear etiology but this appears less likely seizure, need to rule out cardiac etiology versus secondary to dehydration his sodium was 129 on arrival.    This does not appear to be related to a vascular event or related to his pituitary findings.    · Mildly enlarged pituitary gland and thickening pituitary stalk -Per CTA report differential includes  hypophysitis, pituitary macroadenoma, and less likely metastasis    -Admitted to medicine  -MRI of the brain with and without contrast pituitary protocol  -Agree with endocrine consult  -May need neurosurgical consult  -No need for EEG,  less likely seizure  -Check orthostatic blood pressures  -Would recommend checking cardiac work-up echo, telemetry and possible cardiology evaluation  -Neurological checks notify neurology with any changes in neurological examination  -Continue to monitor and treat underlying metabolic and infectious derangements per medicine team, NA was 129 on arrival requiring IV fluids.  -Reviewed with medicine team.   -Please see attestation for details    Recommendations for outpatient neurological follow up have yet to be determined. History of Present Illness     Reason for Consult / Principal Problem:   Syncope    HPI: Todd Shea is a 82 y.o.  with hx as listed below, he reports he has a hx of hypothyroidism. Sangita Norman He follows with GI for constipation and diarrhea, he follows with gastroenterology in regards to this. He has been on a stool softener, started recently. He does have a hx of melanoma, he had at resected. Inner ear infection a month ago which resolved. He denies any prior neurological hx. He reports he has fainted in the past in the setting of covid, he reports he was dehydrated at that time, this was a year and a half ago. He reports normal state of health, he reports he went to sleep on the chair at around 1 a.m., he has been having back pain and left hip pain which is chronic,. He reports he awoke at around 7 a.m, he reports at around 8 am he got up and had coffee. The patient was sitting at the computer, he noticed he had "fuzziness of the computer screen, could not focus sitting on the chair", it was reported by wife he had a period of starring off into the distance and did not respond to what he was saying to her/loss of awareness. He did not fall out of his chair. He believes he had recollection of the entire event. He remembers her poking him and saying stuff to him. It was reported this lasted a minute or two and resolved.      The wife whom witnessed the event, the patient starred off and went limp and was not moving his extremities. No seizure like activity. No focal findings. He may have been complaining of vertigo prior. No confusion after. He reports he felt diaphoretic, he did not feel lightheaded or dizzy. No headache or stroke like symptoms reported. No palpitations reported. He was not confused after, no tongue bite or bowel or bladder incontinence. The patient reports he is back to normal with no complaints. No neurological complaints, he reports he feels fine at this time. Patient reports that he had an MRI in the past which did show a pituitary enlargement, this MRI was completed 10 years ago. There is no records in our system of this being completed. Vital Signs arrival blood pressure was 156/81 pulse was 70 respirations were 18. CBC was unremarkable  CMP did show a sodium of 129  Troponin was normal.   Glucose was 112    CTA of the head and neck was completed in the ED-  No acute infarct or intracranial hemorrhage. Mildly enlarged pituitary gland with thickened pituitary stalk. Differential includes hypophysitis, pituitary macroadenoma, and less likely metastasis (given history of melanoma). Recommend correlation with pituitary hormone levels and review of   medication history. Recommend follow-up MRI brain pituitary protocol with and without contrast.   Negative CTA head and neck for large vessel occlusion, dissection, aneurysm, or high-grade stenosis. In the ED the patient was given IV fluids. Neurology was asked to see in regards to his presenting symptoms and above CTA findings. Inpatient consult to Neurology  Consult performed by: Tiffany Chaudhari PA-C  Consult ordered by: Grey Coleman MD        Review of Systems   12 point ROS as per HPI, otherwise 12 point ROS are negative.      Historical Information   Past Medical History:   Diagnosis Date   • Disease of thyroid gland    • Melanoma [facility])     Underneath foot     Past Surgical History:   Procedure Laterality Date   • COLONOSCOPY  07/31/2009   • COLONOSCOPY  07/31/2009    Hyperplastic colon polyp, mild diverticulosis in the sigmoid and internal hemorrhoids. Ten year recall advised. Social History   Social History     Substance and Sexual Activity   Alcohol Use Yes     Social History     Substance and Sexual Activity   Drug Use Not on file     E-Cigarette/Vaping   • E-Cigarette Use Never User      E-Cigarette/Vaping Substances   • Nicotine No    • THC No    • CBD No    • Flavoring No    • Other No    • Unknown No      Social History     Tobacco Use   Smoking Status Never   Smokeless Tobacco Never     Family History:   Family History   Problem Relation Age of Onset   • Colon polyps Neg Hx    • Colon cancer Neg Hx      Meds/Allergies   all current active meds have been reviewed, current meds:   Current Facility-Administered Medications   Medication Dose Route Frequency   • heparin (porcine) subcutaneous injection 5,000 Units  5,000 Units Subcutaneous Q8H [address]   • [START ON 7/29/2023] levothyroxine tablet 25 mcg  25 mcg Oral Early Morning    and PTA meds:   Prior to Admission Medications   Prescriptions Last Dose Informant Patient Reported? Taking?   levothyroxine 25 mcg tablet 7/28/2023  Yes Yes   Sig: Take 25 mcg by mouth daily   losartan (COZAAR) 25 mg tablet Not Taking Self Yes No   Sig: Take 25 mg by mouth daily   Patient not taking: Reported on 7/28/2023   polyethylene glycol (COLYTE) 4000 mL solution   No No   Sig: Take 4,000 mL by mouth once for 1 dose      Facility-Administered Medications: None     No Known Allergies    Objective   Vitals:Blood pressure 156/81, pulse 70, temperature 97.8 °F (36.6 °C), temperature source Oral, resp. rate 20, SpO2 98 %. ,There is no height or weight on file to calculate BMI.     Intake/Output Summary (Last 24 hours) at 7/28/2023 1421  Last data filed at 7/28/2023 1201  Gross per 24 hour   Intake 1000 ml Output --   Net 1000 ml     Invasive Devices: Invasive Devices     Peripheral Intravenous Line  Duration           Peripheral IV 07/28/23 Right Antecubital <1 day              Vital signs reviewed  Constitutional - in NAD, pleasant and cooperative, lying in bed. HEENT - NC/AT, no icterus noted, conjuctiva clear, oral mucosa free of exudate, no meningismus, pleasant and coopeartive  Cardiac -rate regular  Lungs - Clear to auscultation bilaterally, no wheezing noted. No dyspnea to conversation. Abdomen - Soft and non tender, non distended  Extremities - No edema noted  Skin - no rashes noted  Neurological  Mental status - the patient is awake alert oriented x 3, with no evidence of aphasia or dysarthria, patient is able to follow simple commands, is able to follow complex commands. No paraphasic errors noted. He is able to read and recognize objects, he able to do calculations. He is able to tell me president and current events, he has good insight and is a good historian. He is able to repeat. Cranial nerves 2 through 12 are intact, mild decrease in left nfl with no weakness, EOMI, no VF deficits noted. Motor - 5/5 upper extremities and lower extremities, without drift, normal tone and bulk  No tremor or fixed deficit noted  Rapid movements are equal bilaterally  Sensation - nonfocal to touch ore temperature, no neglect noted. Coordination - no ataxia noted on finger-to-nose or heel-to-shin  Reflexes are equal - 1in the uppers and lowers  Toes are downgoing bilaterally  No evidence of clonus or myoclonus or tremor. No evidence of seizure activity, observed. NIH of a zero.     Lab Results:  Recent Results (from the past 24 hour(s))   ECG 12 lead    Collection Time: 07/28/23  9:43 AM   Result Value Ref Range    Ventricular Rate 66 BPM    Atrial Rate 66 BPM    OK Interval 210 ms    QRSD Interval 86 ms    QT Interval 430 ms    QTC Interval 450 ms    P Axis 45 degrees    QRS Axis 2 degrees    T Wave Axis 0 degrees   CBC and differential    Collection Time: 07/28/23  9:50 AM   Result Value Ref Range    WBC 7.48 4.31 - 10.16 Thousand/uL    RBC 4.62 3.88 - 5.62 Million/uL    Hemoglobin 13.5 12.0 - 17.0 g/dL    Hematocrit 39.0 36.5 - 49.3 %    MCV 84 82 - 98 fL    MCH 29.2 26.8 - 34.3 pg    MCHC 34.6 31.4 - 37.4 g/dL    RDW 12.2 11.6 - 15.1 %    MPV 9.0 8.9 - 12.7 fL    Platelets 743 477 - 805 Thousands/uL    nRBC 0 /100 WBCs    Neutrophils Relative 53 43 - 75 %    Immat GRANS % 0 0 - 2 %    Lymphocytes Relative 34 14 - 44 %    Monocytes Relative 6 4 - 12 %    Eosinophils Relative 6 0 - 6 %    Basophils Relative 1 0 - 1 %    Neutrophils Absolute 4.00 1.85 - 7.62 Thousands/µL    Immature Grans Absolute 0.02 0.00 - 0.20 Thousand/uL    Lymphocytes Absolute 2.54 0.60 - 4.47 Thousands/µL    Monocytes Absolute 0.42 0.17 - 1.22 Thousand/µL    Eosinophils Absolute 0.45 0.00 - 0.61 Thousand/µL    Basophils Absolute 0.05 0.00 - 0.10 Thousands/µL   Comprehensive metabolic panel    Collection Time: 07/28/23  9:50 AM   Result Value Ref Range    Sodium 129 (L) 135 - 147 mmol/L    Potassium 4.1 3.5 - 5.3 mmol/L    Chloride 95 (L) 96 - 108 mmol/L    CO2 29 21 - 32 mmol/L    ANION GAP 5 mmol/L    BUN 17 5 - 25 mg/dL    Creatinine 1.02 0.60 - 1.30 mg/dL    Glucose 100 65 - 140 mg/dL    Calcium 9.4 8.4 - 10.2 mg/dL    AST 19 13 - 39 U/L    ALT 8 7 - 52 U/L    Alkaline Phosphatase 57 34 - 104 U/L    Total Protein 7.0 6.4 - 8.4 g/dL    Albumin 4.3 3.5 - 5.0 g/dL    Total Bilirubin 0.50 0.20 - 1.00 mg/dL    eGFR 68 ml/min/1.73sq m   HS Troponin 0hr (reflex protocol)    Collection Time: 07/28/23  9:50 AM   Result Value Ref Range    hs TnI 0hr <2 "Refer to ACS Flowchart"- see link ng/L   Fingerstick Glucose (POCT)    Collection Time: 07/28/23  9:51 AM   Result Value Ref Range    POC Glucose 112 65 - 140 mg/dl   HS Troponin I 2hr    Collection Time: 07/28/23 12:01 PM   Result Value Ref Range    hs TnI 2hr <2 "Refer to ACS Flowchart"- see link ng/L    Delta 2hr hsTnI       Procedure: CTA head and neck with and without contrast    Result Date: 7/28/2023  Narrative: CTA NECK AND BRAIN WITH AND WITHOUT CONTRAST INDICATION: syncope with vertiginous symptoms. History of melanoma. COMPARISON:   None. TECHNIQUE:  Routine CT imaging of the Brain without contrast.  Post contrast imaging was performed after administration of iodinated contrast through the neck and brain. Post contrast axial 0.625 mm images timed to opacify the arterial system. 3D rendering was performed on an independent workstation. MIP reconstructions performed. Coronal reconstructions were performed of the noncontrast portion of the brain. Radiation dose length product (DLP) for this visit:  1260.78 mGy-cm . This examination, like all CT scans performed in the [facility], was performed utilizing techniques to minimize radiation dose exposure, including the use of iterative reconstruction and automated exposure control. IV Contrast:  85 mL of iohexol (OMNIPAQUE) IMAGE QUALITY:   Diagnostic FINDINGS: NONCONTRAST BRAIN PARENCHYMA: Decreased attenuation is noted in periventricular and subcortical white matter demonstrating an appearance that is statistically most likely to represent mild microangiopathic change. Mildly enlarged pituitary gland with thickened pituitary stalk. No CT signs of acute infarction. No parenchymal mass. No mass effect or midline shift. No acute parenchymal hemorrhage. VENTRICLES AND EXTRA-AXIAL SPACES:  Normal for the patient's age. VISUALIZED ORBITS: Sequela of bilateral cataract surgery. PARANASAL SINUSES: Mild mucosal thickening in bilateral maxillary sinuses. CERVICAL VASCULATURE AORTIC ARCH AND GREAT VESSELS:  Mild calcified atherosclerotic disease of the arch, proximal great vessels and visualized subclavian vessels. No significant stenosis. RIGHT VERTEBRAL ARTERY CERVICAL SEGMENT:  Normal origin.  The vessel is normal in caliber throughout the neck. LEFT VERTEBRAL ARTERY CERVICAL SEGMENT:  Normal origin. The vessel is normal in caliber throughout the neck. RIGHT EXTRACRANIAL CAROTID SEGMENT:  Normal caliber common carotid artery. Mild noncalcified atherosclerotic disease in carotid bifurcation. Normal cervical internal carotid artery. No stenosis or dissection. LEFT EXTRACRANIAL CAROTID SEGMENT:  Normal caliber common carotid artery. Minimal calcified atherosclerotic disease in carotid bifurcation. Normal cervical internal carotid artery. No stenosis or dissection. NASCET criteria was used to determine the degree of internal carotid artery diameter stenosis. INTRACRANIAL VASCULATURE INTERNAL CAROTID ARTERIES:  Normal enhancement of the intracranial portions of the internal carotid arteries. Normal ophthalmic artery origins. Normal ICA terminus. ANTERIOR CIRCULATION: Normal caliber right A1 segment. Mildly hypoplastic left A1 segment, normal variant. Anterior cerebral arteries with normal enhancement. Normal anterior communicating artery. MIDDLE CEREBRAL ARTERY CIRCULATION:  M1 segment and middle cerebral artery branches demonstrate normal enhancement bilaterally. DISTAL VERTEBRAL ARTERIES:  Normal distal vertebral arteries. Posterior inferior cerebellar artery origins are normal. Normal vertebral basilar junction. BASILAR ARTERY:  Basilar artery is normal in caliber. Normal superior cerebellar arteries. POSTERIOR CEREBRAL ARTERIES: Both posterior cerebral arteries arises from the basilar tip. Both arteries demonstrate normal enhancement. VENOUS STRUCTURES:  Normal given suboptimal venous contrast opacification due to arterial bolus timing. NON VASCULAR ANATOMY BONY STRUCTURES:  No acute osseous abnormality. Moderate multilevel degenerative changes of cervical spine, worse at C5-C6. Scattered dental caries and periapical lucencies suggestive of poor dentition with periodontal disease. SOFT TISSUES OF THE NECK:  Unremarkable.  THORACIC INLET: Normal.     Impression: No acute infarct or intracranial hemorrhage. Mildly enlarged pituitary gland with thickened pituitary stalk. Differential includes hypophysitis, pituitary macroadenoma, and less likely metastasis (given history of melanoma). Recommend correlation with pituitary hormone levels and review of medication history. Recommend follow-up MRI brain pituitary protocol with and without contrast. Negative CTA head and neck for large vessel occlusion, dissection, aneurysm, or high-grade stenosis. Additional chronic/incidental findings as detailed above. The study was marked in [facility] for immediate notification. Workstation performed: [id number]     Imaging Studies: I have personally reviewed pertinent reports. EKG, Pathology, and Other Studies: I have personally reviewed pertinent reports. Code Status: Level 1 - Full Code    Counseling / Coordination of Care  Reviewed case with neurology attending, history and physical examination, labs and imaging completed, plan of care as per attending physician. Please see attestation for further details. Examined alongside attending physician.

## 2023-07-28 NOTE — ASSESSMENT & PLAN NOTE
· Presentation as above  · CTA head and neck -with mildly enlarged pituitary gland with thickened pituitary stalk  · Per patient MRI brain done on 12/18/2012 at [facility] had shown mild prominence of pituitary gland  · Discussed with endocrine - MRI pituitary protocol, and anterior pituitary hormonal panel ordered  · Discussed with neurology - MRI pituitary protocol, consult neurosurgery

## 2023-07-28 NOTE — ASSESSMENT & PLAN NOTE
· Syncopal event this morning at around 8 am while sitting at his desk lasting a few minutes preceded by blurred vision for 3-4 minutes   · CTA head and neck -  Mildly enlarged pituitary gland with thickened pituitary stalk. Differential includes hypophysitis, pituitary macroadenoma, and less likely metastasis (given history of melanoma).    · Discussed with neurology - rule out cardiac etiology of syncope  · Check orthostatic vitals, echo, monitor on telemetry

## 2023-07-29 ENCOUNTER — APPOINTMENT (INPATIENT)
Dept: MRI IMAGING | Facility: HOSPITAL | Age: 83
DRG: 312 | End: 2023-07-29
Payer: COMMERCIAL

## 2023-07-29 LAB
ANION GAP SERPL CALCULATED.3IONS-SCNC: 6 MMOL/L
BASOPHILS # BLD AUTO: 0.04 THOUSANDS/ÂΜL (ref 0–0.1)
BASOPHILS NFR BLD AUTO: 1 % (ref 0–1)
BUN SERPL-MCNC: 15 MG/DL (ref 5–25)
CALCIUM SERPL-MCNC: 9 MG/DL (ref 8.4–10.2)
CHLORIDE SERPL-SCNC: 99 MMOL/L (ref 96–108)
CO2 SERPL-SCNC: 26 MMOL/L (ref 21–32)
CORTIS AM PEAK SERPL-MCNC: 5.1 UG/DL (ref 6.7–22.6)
CREAT SERPL-MCNC: 0.98 MG/DL (ref 0.6–1.3)
EOSINOPHIL # BLD AUTO: 0.39 THOUSAND/ÂΜL (ref 0–0.61)
EOSINOPHIL NFR BLD AUTO: 7 % (ref 0–6)
ERYTHROCYTE [DISTWIDTH] IN BLOOD BY AUTOMATED COUNT: 12.1 % (ref 11.6–15.1)
GFR SERPL CREATININE-BSD FRML MDRD: 72 ML/MIN/1.73SQ M
GLUCOSE SERPL-MCNC: 81 MG/DL (ref 65–140)
HCT VFR BLD AUTO: 38 % (ref 36.5–49.3)
HGB BLD-MCNC: 13 G/DL (ref 12–17)
IMM GRANULOCYTES # BLD AUTO: 0.01 THOUSAND/UL (ref 0–0.2)
IMM GRANULOCYTES NFR BLD AUTO: 0 % (ref 0–2)
LYMPHOCYTES # BLD AUTO: 2.55 THOUSANDS/ÂΜL (ref 0.6–4.47)
LYMPHOCYTES NFR BLD AUTO: 43 % (ref 14–44)
MAGNESIUM SERPL-MCNC: 2.1 MG/DL (ref 1.9–2.7)
MCH RBC QN AUTO: 29.1 PG (ref 26.8–34.3)
MCHC RBC AUTO-ENTMCNC: 34.2 G/DL (ref 31.4–37.4)
MCV RBC AUTO: 85 FL (ref 82–98)
MONOCYTES # BLD AUTO: 0.37 THOUSAND/ÂΜL (ref 0.17–1.22)
MONOCYTES NFR BLD AUTO: 6 % (ref 4–12)
NEUTROPHILS # BLD AUTO: 2.53 THOUSANDS/ÂΜL (ref 1.85–7.62)
NEUTS SEG NFR BLD AUTO: 43 % (ref 43–75)
NRBC BLD AUTO-RTO: 0 /100 WBCS
PLATELET # BLD AUTO: 237 THOUSANDS/UL (ref 149–390)
PMV BLD AUTO: 8.9 FL (ref 8.9–12.7)
POTASSIUM SERPL-SCNC: 3.9 MMOL/L (ref 3.5–5.3)
PROLACTIN SERPL-MCNC: 10.45 NG/ML
RBC # BLD AUTO: 4.47 MILLION/UL (ref 3.88–5.62)
SODIUM SERPL-SCNC: 131 MMOL/L (ref 135–147)
T4 FREE SERPL-MCNC: 0.84 NG/DL (ref 0.61–1.12)
TSH SERPL DL<=0.05 MIU/L-ACNC: 0.79 UIU/ML (ref 0.45–4.5)
WBC # BLD AUTO: 5.89 THOUSAND/UL (ref 4.31–10.16)

## 2023-07-29 PROCEDURE — 85025 COMPLETE CBC W/AUTO DIFF WBC: CPT | Performed by: INTERNAL MEDICINE

## 2023-07-29 PROCEDURE — 70553 MRI BRAIN STEM W/O & W/DYE: CPT

## 2023-07-29 PROCEDURE — 84146 ASSAY OF PROLACTIN: CPT | Performed by: INTERNAL MEDICINE

## 2023-07-29 PROCEDURE — A9585 GADOBUTROL INJECTION: HCPCS | Performed by: INTERNAL MEDICINE

## 2023-07-29 PROCEDURE — 84443 ASSAY THYROID STIM HORMONE: CPT | Performed by: INTERNAL MEDICINE

## 2023-07-29 PROCEDURE — 84439 ASSAY OF FREE THYROXINE: CPT | Performed by: INTERNAL MEDICINE

## 2023-07-29 PROCEDURE — 83735 ASSAY OF MAGNESIUM: CPT | Performed by: INTERNAL MEDICINE

## 2023-07-29 PROCEDURE — 84305 ASSAY OF SOMATOMEDIN: CPT | Performed by: INTERNAL MEDICINE

## 2023-07-29 PROCEDURE — 82533 TOTAL CORTISOL: CPT | Performed by: INTERNAL MEDICINE

## 2023-07-29 PROCEDURE — 80048 BASIC METABOLIC PNL TOTAL CA: CPT | Performed by: INTERNAL MEDICINE

## 2023-07-29 PROCEDURE — 99232 SBSQ HOSP IP/OBS MODERATE 35: CPT | Performed by: INTERNAL MEDICINE

## 2023-07-29 PROCEDURE — 82024 ASSAY OF ACTH: CPT | Performed by: INTERNAL MEDICINE

## 2023-07-29 RX ORDER — GADOBUTROL 604.72 MG/ML
8 INJECTION INTRAVENOUS
Status: COMPLETED | OUTPATIENT
Start: 2023-07-29 | End: 2023-07-29

## 2023-07-29 RX ORDER — LEVOTHYROXINE SODIUM 0.05 MG/1
50 TABLET ORAL ONCE
Status: COMPLETED | OUTPATIENT
Start: 2023-07-29 | End: 2023-07-29

## 2023-07-29 RX ORDER — SODIUM CHLORIDE 9 MG/ML
75 INJECTION, SOLUTION INTRAVENOUS CONTINUOUS
Status: DISCONTINUED | OUTPATIENT
Start: 2023-07-29 | End: 2023-07-30 | Stop reason: HOSPADM

## 2023-07-29 RX ORDER — LEVOTHYROXINE SODIUM 0.07 MG/1
75 TABLET ORAL
Status: DISCONTINUED | OUTPATIENT
Start: 2023-07-30 | End: 2023-07-30 | Stop reason: HOSPADM

## 2023-07-29 RX ADMIN — SODIUM CHLORIDE 75 ML/HR: 0.9 INJECTION, SOLUTION INTRAVENOUS at 21:31

## 2023-07-29 RX ADMIN — GADOBUTROL 8 ML: 604.72 INJECTION INTRAVENOUS at 11:38

## 2023-07-29 RX ADMIN — LEVOTHYROXINE SODIUM 50 MCG: 50 TABLET ORAL at 06:08

## 2023-07-29 RX ADMIN — HEPARIN SODIUM 5000 UNITS: 5000 INJECTION INTRAVENOUS; SUBCUTANEOUS at 05:59

## 2023-07-29 RX ADMIN — SODIUM CHLORIDE 75 ML/HR: 0.9 INJECTION, SOLUTION INTRAVENOUS at 10:11

## 2023-07-29 RX ADMIN — LEVOTHYROXINE SODIUM 25 MCG: 25 TABLET ORAL at 05:57

## 2023-07-29 NOTE — PLAN OF CARE
Problem: Potential for Falls  Goal: Patient will remain free of falls  Description: INTERVENTIONS:  - Educate patient/family on patient safety including physical limitations  - Instruct patient to call for assistance with activity   - Consult OT/PT to assist with strengthening/mobility   - Keep Call bell within reach  - Keep bed low and locked with side rails adjusted as appropriate  - Keep care items and personal belongings within reach  - Initiate and maintain comfort rounds  - Make Fall Risk Sign visible to staff  - Offer Toileting every  Hours, in advance of need  - Initiate/Maintain alarm  - Obtain necessary fall risk management equipment:  - Apply yellow socks and bracelet for high fall risk patients  - Consider moving patient to room near nurses station  Outcome: Progressing     Problem: PAIN - ADULT  Goal: Verbalizes/displays adequate comfort level or baseline comfort level  Description: Interventions:  - Encourage patient to monitor pain and request assistance  - Assess pain using appropriate pain scale  - Administer analgesics based on type and severity of pain and evaluate response  - Implement non-pharmacological measures as appropriate and evaluate response  - Consider cultural and social influences on pain and pain management  - Notify physician/advanced practitioner if interventions unsuccessful or patient reports new pain  Outcome: Progressing     Problem: INFECTION - ADULT  Goal: Absence or prevention of progression during hospitalization  Description: INTERVENTIONS:  - Assess and monitor for signs and symptoms of infection  - Monitor lab/diagnostic results  - Monitor all insertion sites, i.e. indwelling lines, tubes, and drains  - Monitor endotracheal if appropriate and nasal secretions for changes in amount and color  - [place] appropriate cooling/warming therapies per order  - Administer medications as ordered  - Instruct and encourage patient and family to use good hand hygiene technique  - Identify and instruct in appropriate isolation precautions for identified infection/condition  Outcome: Progressing  Goal: Absence of fever/infection during neutropenic period  Description: INTERVENTIONS:  - Monitor WBC    Outcome: Progressing     Problem: SAFETY ADULT  Goal: Patient will remain free of falls  Description: INTERVENTIONS:  - Educate patient/family on patient safety including physical limitations  - Instruct patient to call for assistance with activity   - Consult OT/PT to assist with strengthening/mobility   - Keep Call bell within reach  - Keep bed low and locked with side rails adjusted as appropriate  - Keep care items and personal belongings within reach  - Initiate and maintain comfort rounds  - Make Fall Risk Sign visible to staff  - Offer Toileting every  Hours, in advance of need  - Initiate/Maintain alarm  - Obtain necessary fall risk management equipment:  - Apply yellow socks and bracelet for high fall risk patients  - Consider moving patient to room near nurses station  Outcome: Progressing  Goal: Maintain or return to baseline ADL function  Description: INTERVENTIONS:  -  Assess patient's ability to carry out ADLs; assess patient's baseline for ADL function and identify physical deficits which impact ability to perform ADLs (bathing, care of mouth/teeth, toileting, grooming, dressing, etc.)  - Assess/evaluate cause of self-care deficits   - Assess range of motion  - Assess patient's mobility; develop plan if impaired  - Assess patient's need for assistive devices and provide as appropriate  - Encourage maximum independence but intervene and supervise when necessary  - Involve family in performance of ADLs  - Assess for home care needs following discharge   - Consider OT consult to assist with ADL evaluation and planning for discharge  - Provide patient education as appropriate  Outcome: Progressing  Goal: Maintains/Returns to pre admission functional level  Description: INTERVENTIONS:  - Perform BMAT or MOVE assessment daily.   - Set and communicate daily mobility goal to care team and patient/family/caregiver. - Collaborate with rehabilitation services on mobility goals if consulted  - Perform Range of Motion  times a day. - Reposition patient every  hours. - Dangle patient  times a day  - Stand patient  times a day  - Ambulate patient  times a day  - Out of bed to chair  times a day   - Out of bed for meals times a day  - Out of bed for toileting  - Record patient progress and toleration of activity level   Outcome: Progressing     Problem: DISCHARGE PLANNING  Goal: Discharge to home or other facility with appropriate resources  Description: INTERVENTIONS:  - Identify barriers to discharge w/patient and caregiver  - Arrange for needed discharge resources and transportation as appropriate  - Identify discharge learning needs (meds, wound care, etc.)  - Arrange for interpretive services to assist at discharge as needed  - Refer to Case Management Department for coordinating discharge planning if the patient needs post-hospital services based on physician/advanced practitioner order or complex needs related to functional status, cognitive ability, or social support system  Outcome: Progressing     Problem: Knowledge Deficit  Goal: Patient/family/caregiver demonstrates understanding of disease process, treatment plan, medications, and discharge instructions  Description: Complete learning assessment and assess knowledge base.   Interventions:  - Provide teaching at level of understanding  - Provide teaching via preferred learning methods  Outcome: Progressing

## 2023-07-29 NOTE — PROGRESS NOTES
[facility]  Progress Note  Name: Alton Chavis  MRN: [id number]  Unit/Bed#: -01 I Date of Admission: 7/28/2023   Date of Service: 7/29/2023 I Hospital Day: 1    Assessment/Plan   History of hypertension  Assessment & Plan  · Was taken off medications few years ago when he lost weight BP as outpatient was acceptable off medications  · Monitor BP    Hyponatremia  Assessment & Plan  · Na 129  · Received 1 L normal saline  · Sodium is 131  · On IV fluids  · Trend BMP    History of melanoma  Assessment & Plan  · Melanoma at the bottom of his foot excised in 2007   · Follow-up MRI brain with contrast    Hypothyroidism  Assessment & Plan  · Continue levothyroxine at home dose of 25 mcg daily  · Pituitary work-up as above   · MRI brain with pituitary protocol pending  · TSH with Free T4 is normal   · Cortisol, ACTH and IGF-I and Pro-lactin are pending    Enlarged pituitary gland [facility])  Assessment & Plan  · Presentation as above  · CTA head and neck -with mildly enlarged pituitary gland with thickened pituitary stalk  · Per patient MRI brain done on 12/18/2012 at [facility] had shown mild prominence of pituitary gland  · Discussed with endocrine - MRI pituitary protocol, and anterior pituitary hormonal panel ordered  · Discussed with neurology - MRI pituitary protocol, consult neurosurgery    * Syncope  Assessment & Plan  · Syncopal event this morning at around 8 am while sitting at his desk lasting a few minutes preceded by blurred vision for 3-4 minutes   · CTA head and neck -  Mildly enlarged pituitary gland with thickened pituitary stalk. Differential includes hypophysitis, pituitary macroadenoma, and less likely metastasis (given history of melanoma).    · Discussed with neurology - rule out cardiac etiology of syncope  · Neurology consult appreciated  · Check orthostatic vitals  · Monitor on telemetry  · Echocardiogram.  May need cardiology evaluation if echo is abnormal  · No need for EEG as per neurology  · Endocrine consult appreciated  · MRI brain with and without contrast pituitary protocol pending  · Neurology follow-up           Labs & Imaging: I have personally reviewed pertinent reports. VTE Prophylaxis: in place. Code Status:   Level 1 - Full Code    Patient Centered Rounds: I have performed bedside rounds with nursing staff today. Discussions with Specialists or Other Care Team Provider: CM    Education and Discussions with Family / Patient: Patient stated he will update his wife. I offered to call    Current Length of Stay: 1 day(s)    Current Patient Status: Inpatient   Certification Statement: The patient will continue to require additional inpatient hospital stay due to see my assessment and plan. Subjective:   Patient is seen and examined at bedside. Denies any new complaints. Afebrile  All other ROS are negative. Objective:    Vitals: Blood pressure 129/85, pulse 85, temperature 98.3 °F (36.8 °C), resp. rate 15, height 5' 10" (1.778 m), weight 77.5 kg (170 lb 13.7 oz), SpO2 97 %. ,Body mass index is 24.52 kg/m². SPO2 RA Rest    Flowsheet Row ED to Hosp-Admission (Current) from 7/28/2023 in [facility] Med Surg Unit   SpO2 97 %   SpO2 Activity At Rest   O2 Device None (Room air)   O2 Flow Rate --        I&O:     Intake/Output Summary (Last 24 hours) at 7/29/2023 0942  Last data filed at 7/28/2023 1201  Gross per 24 hour   Intake 1000 ml   Output --   Net 1000 ml       Physical Exam:    General- Aler sitting comfortably in chair. Not in any acute distress. Neck- Supple, No JVD  CVS- regular, S1 and S2 normal  Chest- Bilateral Air entry, No rhochi, crackles or wheezing present. Abdomen- soft, nontender, not distended, no guarding or rigidity, BS+  Extremities-  No pedal edema, No calf tenderness                         Normal ROM in all extremities. CNS-   Alert, awake and orientedx3.  No focal deficits present. Invasive Devices     Peripheral Intravenous Line  Duration           Peripheral IV 07/28/23 Right Antecubital <1 day                      Social History  reviewed  Family History   Problem Relation Age of Onset   • Colon polyps Neg Hx    • Colon cancer Neg Hx     reviewed    Meds:  Current Facility-Administered Medications   Medication Dose Route Frequency Provider Last Rate Last Admin   • heparin (porcine) subcutaneous injection 5,000 Units  5,000 Units Subcutaneous [facility] Leigh Zaidi MD   5,000 Units at 07/29/23 0559   • [START ON 7/30/2023] levothyroxine tablet 75 mcg  75 mcg Oral Early Morning Robbi Kent PA-C       • LORazepam (ATIVAN) injection 1 mg  1 mg Intravenous On Call Leigh Zaidi MD       • sodium chloride 0.9 % infusion  75 mL/hr Intravenous Continuous Rommel Crespo MD          Medications Prior to Admission   Medication   • levothyroxine 25 mcg tablet   • losartan (COZAAR) 25 mg tablet   • polyethylene glycol (COLYTE) 4000 mL solution       Labs:  Results from last 7 days   Lab Units 07/29/23  0817 07/28/23  0950   WBC Thousand/uL 5.89 7.48   HEMOGLOBIN g/dL 13.0 13.5   HEMATOCRIT % 38.0 39.0   PLATELETS Thousands/uL 237 262   NEUTROS PCT % 43 53   LYMPHS PCT % 43 34   MONOS PCT % 6 6   EOS PCT % 7* 6     Results from last 7 days   Lab Units 07/29/23  0817 07/28/23  0950   POTASSIUM mmol/L 3.9 4.1   CHLORIDE mmol/L 99 95*   CO2 mmol/L 26 29   BUN mg/dL 15 17   CREATININE mg/dL 0.98 1.02   CALCIUM mg/dL 9.0 9.4   ALK PHOS U/L  --  57   ALT U/L  --  8   AST U/L  --  19     No results found for: "TROPONINI", "CKMB", "CKTOTAL"      No results found for: "BLOODCX", "Meche Blow", "WOUNDCULT", "SPUTUMCULTUR"      Imaging:  No results found for this or any previous visit. No results found for this or any previous visit.       Last 24 Hours Medication List:   Current Facility-Administered Medications   Medication Dose Route Frequency Provider Last Rate   • heparin (porcine) 5,000 Units Subcutaneous [facility] Radha Benavides MD     • [START ON 7/30/2023] levothyroxine  75 mcg Oral Early Morning Margie Queen PA-C     • LORazepam  1 mg Intravenous On Call Radha Benavides MD     • sodium chloride  75 mL/hr Intravenous Continuous Pablo Damon MD          Today, Patient Was Seen By: Pablo Damon MD    ** Please Note: Dictation voice to text software may have been used in the creation of this document.  **

## 2023-07-29 NOTE — ASSESSMENT & PLAN NOTE
· Syncopal event this morning at around 8 am while sitting at his desk lasting a few minutes preceded by blurred vision for 3-4 minutes   · CTA head and neck -  Mildly enlarged pituitary gland with thickened pituitary stalk. Differential includes hypophysitis, pituitary macroadenoma, and less likely metastasis (given history of melanoma).    · Discussed with neurology - rule out cardiac etiology of syncope  · Neurology consult appreciated  · Check orthostatic vitals  · Monitor on telemetry  · Echocardiogram.  May need cardiology evaluation if echo is abnormal  · No need for EEG as per neurology  · Endocrine consult appreciated  · MRI brain with and without contrast pituitary protocol pending  · Neurology follow-up

## 2023-07-29 NOTE — ASSESSMENT & PLAN NOTE
· Continue levothyroxine at home dose of 25 mcg daily  · Pituitary work-up as above   · MRI brain with pituitary protocol pending  · TSH with Free T4 is normal   · Cortisol, ACTH and IGF-I and Pro-lactin are pending

## 2023-07-29 NOTE — ASSESSMENT & PLAN NOTE
· Presentation as above  · CTA head and neck -with mildly enlarged pituitary gland with thickened pituitary stalk  · Per patient MRI brain done on 12/18/2012 at [facility] had shown mild prominence of pituitary gland  · Discussed with endocrine - MRI pituitary protocol, and anterior pituitary hormonal panel ordered  · Discussed with neurology - MRI pituitary protocol, consult neurosurgery

## 2023-07-29 NOTE — UTILIZATION REVIEW
NOTIFICATION OF INPATIENT ADMISSION   AUTHORIZATION REQUEST   SERVICING FACILITY:   [address] [place] [address]  [address] [facility],Third [address] [address]  Tax ID: [id number]  NPI: [id number] ATTENDING PROVIDER:  Attending Name and NPI#: Yeu Bragg Md [[id number]]  Address: [address] [facility],Third Floor [address] [address]  Phone: [phone]   ADMISSION INFORMATION:  Place of Service: [address] [place] [address]  Place of Service Code: 21  Inpatient Admission Date/Time: 7/28/23  2:13 PM  Discharge Date/Time: No discharge date for patient encounter. Admitting Diagnosis Code/Description:  Hyponatremia [E87.1]  Syncope [R55]  Dizzy [R42]  Pituitary gland enlarged ([address]) [E23.6]     UTILIZATION REVIEW CONTACT:  Maren Monge Utilization   Network Utilization Review Department  Phone: [phone]  Fax [phone]  Email: [email or web address]  Contact for approvals/pending authorizations, clinical reviews, and discharge. PHYSICIAN ADVISORY SERVICES:  Medical Necessity Denial & Clri-nu-Rqlf Review  Phone: [phone]  Fax: [phone]  Email: [email or web address]

## 2023-07-30 VITALS
WEIGHT: 170.86 LBS | TEMPERATURE: 98.1 F | HEIGHT: 70 IN | SYSTOLIC BLOOD PRESSURE: 132 MMHG | HEART RATE: 69 BPM | RESPIRATION RATE: 14 BRPM | BODY MASS INDEX: 24.46 KG/M2 | DIASTOLIC BLOOD PRESSURE: 80 MMHG | OXYGEN SATURATION: 98 %

## 2023-07-30 PROBLEM — D35.2 PITUITARY MACROADENOMA (HCC): Status: ACTIVE | Noted: 2023-07-28

## 2023-07-30 LAB
ANION GAP SERPL CALCULATED.3IONS-SCNC: 4 MMOL/L
BASOPHILS # BLD AUTO: 0.04 THOUSANDS/ÂΜL (ref 0–0.1)
BASOPHILS NFR BLD AUTO: 1 % (ref 0–1)
BUN SERPL-MCNC: 17 MG/DL (ref 5–25)
CALCIUM SERPL-MCNC: 8.8 MG/DL (ref 8.4–10.2)
CHLORIDE SERPL-SCNC: 102 MMOL/L (ref 96–108)
CO2 SERPL-SCNC: 27 MMOL/L (ref 21–32)
CREAT SERPL-MCNC: 0.89 MG/DL (ref 0.6–1.3)
EOSINOPHIL # BLD AUTO: 0.38 THOUSAND/ÂΜL (ref 0–0.61)
EOSINOPHIL NFR BLD AUTO: 7 % (ref 0–6)
ERYTHROCYTE [DISTWIDTH] IN BLOOD BY AUTOMATED COUNT: 12 % (ref 11.6–15.1)
FSH SERPL-ACNC: 4.4 MIU/ML
GFR SERPL CREATININE-BSD FRML MDRD: 80 ML/MIN/1.73SQ M
GLUCOSE SERPL-MCNC: 84 MG/DL (ref 65–140)
HCT VFR BLD AUTO: 36.7 % (ref 36.5–49.3)
HGB BLD-MCNC: 12.3 G/DL (ref 12–17)
IMM GRANULOCYTES # BLD AUTO: 0.01 THOUSAND/UL (ref 0–0.2)
IMM GRANULOCYTES NFR BLD AUTO: 0 % (ref 0–2)
LH SERPL-ACNC: 0.8 MIU/ML
LYMPHOCYTES # BLD AUTO: 2.4 THOUSANDS/ÂΜL (ref 0.6–4.47)
LYMPHOCYTES NFR BLD AUTO: 46 % (ref 14–44)
MCH RBC QN AUTO: 28.5 PG (ref 26.8–34.3)
MCHC RBC AUTO-ENTMCNC: 33.5 G/DL (ref 31.4–37.4)
MCV RBC AUTO: 85 FL (ref 82–98)
MONOCYTES # BLD AUTO: 0.32 THOUSAND/ÂΜL (ref 0.17–1.22)
MONOCYTES NFR BLD AUTO: 6 % (ref 4–12)
NEUTROPHILS # BLD AUTO: 2.08 THOUSANDS/ÂΜL (ref 1.85–7.62)
NEUTS SEG NFR BLD AUTO: 40 % (ref 43–75)
NRBC BLD AUTO-RTO: 0 /100 WBCS
PLATELET # BLD AUTO: 232 THOUSANDS/UL (ref 149–390)
PMV BLD AUTO: 9 FL (ref 8.9–12.7)
POTASSIUM SERPL-SCNC: 3.8 MMOL/L (ref 3.5–5.3)
RBC # BLD AUTO: 4.32 MILLION/UL (ref 3.88–5.62)
SODIUM SERPL-SCNC: 133 MMOL/L (ref 135–147)
T3FREE SERPL-MCNC: 2.3 PG/ML (ref 2.5–3.9)
WBC # BLD AUTO: 5.23 THOUSAND/UL (ref 4.31–10.16)

## 2023-07-30 PROCEDURE — 83520 IMMUNOASSAY QUANT NOS NONAB: CPT | Performed by: PHYSICIAN ASSISTANT

## 2023-07-30 PROCEDURE — 83003 ASSAY GROWTH HORMONE (HGH): CPT | Performed by: PHYSICIAN ASSISTANT

## 2023-07-30 PROCEDURE — 99239 HOSP IP/OBS DSCHRG MGMT >30: CPT | Performed by: PHYSICIAN ASSISTANT

## 2023-07-30 PROCEDURE — 83001 ASSAY OF GONADOTROPIN (FSH): CPT | Performed by: PHYSICIAN ASSISTANT

## 2023-07-30 PROCEDURE — 80048 BASIC METABOLIC PNL TOTAL CA: CPT | Performed by: INTERNAL MEDICINE

## 2023-07-30 PROCEDURE — 85025 COMPLETE CBC W/AUTO DIFF WBC: CPT | Performed by: INTERNAL MEDICINE

## 2023-07-30 PROCEDURE — 83002 ASSAY OF GONADOTROPIN (LH): CPT | Performed by: PHYSICIAN ASSISTANT

## 2023-07-30 PROCEDURE — 84481 FREE ASSAY (FT-3): CPT | Performed by: PHYSICIAN ASSISTANT

## 2023-07-30 RX ORDER — IBUPROFEN 400 MG/1
400 TABLET ORAL ONCE
Status: DISCONTINUED | OUTPATIENT
Start: 2023-07-30 | End: 2023-07-30 | Stop reason: HOSPADM

## 2023-07-30 RX ADMIN — SODIUM CHLORIDE 75 ML/HR: 0.9 INJECTION, SOLUTION INTRAVENOUS at 09:45

## 2023-07-30 RX ADMIN — LEVOTHYROXINE SODIUM 75 MCG: 75 TABLET ORAL at 05:23

## 2023-07-30 NOTE — DISCHARGE SUMMARY
[facility]  Discharge- Vinod Gess 1940, 82 y.o. male MRN: [id number]  Unit/Bed#: -[place] Encounter: [id number]  Primary Care Provider: Myla Loera MD   Date and time admitted to hospital: 7/28/2023  9:31 AM    * Syncope  Assessment & Plan  · Syncopal event this morning at around 8 am while sitting at his desk lasting a few minutes preceded by blurred vision for 3-4 minutes   · CTA head and neck with mildly enlarged pituitary gland with thickened pituitary stalk. Differential includes hypophysitis, pituitary macroadenoma, and less likely metastasis (given history of melanoma).    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[facility])  Assessment & Plan  · Presentation as above  · CTA head and neck - with mildly enlarged pituitary gland with thickened pituitary stalk  · Per patient MRI brain done on 12/18/2012 at [facility] had shown mild prominence of pituitary gland  · MRI brain with Pituitary done here showing 2 cm sellar mass abutting the optic chiasm and right optic nerve  · Patient is asymptomatic at this time   · AM cortisol mildly low, TSH/T4 normal, Prolactin normal   · ACTH, IGF pending   · Discussed with Endocrine - can follow up with complete work up outpatient   · Discussed with Neurosurgery - needs formal visual field testing, recommended additional blood work and outpatient follow up below   · Alpha Subunit, T3, FSH, GH, and LH all ordered per Neurosurgery recommendations   · Ophthalmology referral placed for visual field testing - should be done in next 1-2 weeks  · Follow up with Neurosurgery in 2-3 weeks AFTER visual field testing complete   · Follow up with Endocrine   · Appreciate Neurosurgery and Endocrinology input   · Stable for discharge · Discussed with endocrine - MRI pituitary protocol, and anterior pituitary hormonal panel ordered  · Discussed with neurology - MRI pituitary protocol, consult neurosurgery    History of melanoma  Assessment & Plan  · Melanoma at the bottom of his foot excised in 2007   · No further input needed    Hyponatremia  Assessment & Plan  · Na 129  · Received 1 L normal saline  · Improved to 133   · Follow up with Endocrine outpatient   · Avoid laxatives    History of hypertension  Assessment & Plan  · Was taken off medications few years ago when he lost weight BP as outpatient was acceptable off medications  · No addition to medications at this time     Hypothyroidism  Assessment & Plan  · TSH/T4 normal   · Continue levothyroxine 25 mcg daily  · Work up per Endo/NSX as above       Medical Problems     Resolved Problems  Date Reviewed: 7/30/2023   None       Discharging Physician / Practitioner: Candace Baldwin PA-C  PCP: Cinthia Ng MD  Admission Date:   Admission Orders (From admission, onward)     Ordered        07/28/23 1413  Inpatient Admission  Once            07/28/23 1318  Place in Observation  Once                      Discharge Date: 07/30/23    Consultations During Hospital Stay:  · Neurology  · Neurosurgery   · Endocrinology     Procedures Performed:   · CTA Head and Neck   · MRI Brain Pituitary     Significant Findings / Test Results:   · CTA Head and Neck: No acute infarct or intracranial hemorrhage. Mildly enlarged pituitary gland with thickened pituitary stalk. Differential includes hypophysitis, pituitary macroadenoma, and less likely metastasis. Negative CTA head and neck for large vessel occlusion, dissection, aneurysm, or high-grade stenosis. · MRI Brain Pituitary: 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 This abuts the undersurface or the optic chiasm and proximal optic nerve on the right.  Minor white matter change within the brain parenchyma representing mild chronic microangiopathy. Incidental Findings:   · 2 cm sellar mass of which the patient and family are aware and obtaining further consultation and follow up with Ophthalmology and Neurosurgery. Was explicitly discussed with patient with his son and daughter in law present at bedside     Test Results Pending at Discharge (will require follow up):   · ACTH  · IGF  · FSH  · LH  · GH  · T3  · Alpha Subunit      Outpatient Tests Requested:  · ECHO - patient and son/daughter in law aware verbally and print form that he needs to speak with his PCP for these results    Complications:  None    Reason for Admission: Syncope, [address] Course:   Charna Aschoff is a 82 y.o. male patient who originally presented to the hospital on 7/28/2023 due to syncope. On admission the patient underwent CTA Head and Neck. This was negative for vascular abnormalities, but an enlarged pituitary/pituitary stalk was noted. He was admitted, monitored on telemetry, and neurology, endocrinology, and neurosurgery were consulted. His telemetry was normal and he actually had no further neurologic symptoms throughout the rest of his hospitalization. Endocrinology ordered hormonal testing. Prolactin and TSH/T4 were normal. AM cortisol was slightly low. ACTH and IGF are still pending. MRI Brain Pituitary was done showing a 2 cm sellar mass abutting the optic chiasm and right optic nerve. This was discussed with Neurosurgery. He had no further symptoms so they recommended adding on T3, Alpha Subunit, FSH, LH, GH for further serologic work up and outpatient visual field testing with ophthalmology. He was cleared for discharge and advised to hold off on driving until he sees ophthalmology and neurosurgery. He will have an outpatient ECHO which was ordered. He was discharged home in stable condition. Of note, a hyponatremia present on admission improved with IV fluids.  This may have been related to pituitary findings versus laxative abuse. Please see above list of diagnoses and related plan for additional information. Condition at Discharge: stable    Discharge Day Visit / Exam:   Subjective:  Patient reports no symptoms. Denies visual changes or blurred vision. Denies HA. Denies unilateral numbness, tingling, or weakness. Wants to go home. Vitals: Blood Pressure: 132/80 (07/30/23 0815)  Pulse: 69 (07/30/23 0815)  Temperature: 98.1 °F (36.7 °C) (07/30/23 0815)  Temp Source: Oral (07/28/23 0945)  Respirations: 14 (07/30/23 0815)  Height: 5' 10" (177.8 cm) (07/29/23 0500)  Weight - Scale: 77.5 kg (170 lb 13.7 oz) (07/29/23 0500)  SpO2: 98 % (07/30/23 0815)  Exam:   Physical Exam  Constitutional:       General: He is not in acute distress. Appearance: Normal appearance. He is normal weight. He is not ill-appearing or diaphoretic. HENT:      Head: Normocephalic and atraumatic. Mouth/Throat:      Mouth: Mucous membranes are moist.   Eyes:      General: Lids are normal. No scleral icterus. Pupils: Pupils are equal, round, and reactive to light. Cardiovascular:      Rate and Rhythm: Normal rate and regular rhythm. Pulses: Normal pulses. Heart sounds: Normal heart sounds, S1 normal and S2 normal. No murmur heard. No systolic murmur is present. No diastolic murmur is present. No gallop. No S3 or S4 sounds. Pulmonary:      Effort: Pulmonary effort is normal. No accessory muscle usage or respiratory distress. Breath sounds: Normal breath sounds. No stridor. No decreased breath sounds, wheezing, rhonchi or rales. Chest:      Chest wall: No tenderness. Abdominal:      General: Bowel sounds are normal. There is no distension. Palpations: Abdomen is soft. Tenderness: There is no abdominal tenderness. There is no guarding. Musculoskeletal:      Right lower leg: No edema. Left lower leg: No edema. Skin:     General: Skin is warm and dry. Coloration: Skin is not jaundiced. Neurological:      General: No focal deficit present. Mental Status: He is alert. Mental status is at baseline. Cranial Nerves: Cranial nerves 2-12 are intact. No cranial nerve deficit, dysarthria or facial asymmetry. Sensory: Sensation is intact. Motor: Motor function is intact. No tremor or seizure activity. Psychiatric:         Behavior: Behavior is cooperative. Discussion with Family: Updated  (son and daughter in law) at bedside. Discharge instructions/Information to patient and family:   See after visit summary for information provided to patient and family. Provisions for Follow-Up Care:  See after visit summary for information related to follow-up care and any pertinent home health orders. Disposition:   Home    Planned Readmission: None     Discharge Statement:  I spent 45 minutes discharging the patient. This time was spent on the day of discharge. I had direct contact with the patient on the day of discharge. Greater than 50% of the total time was spent examining patient, answering all patient questions, arranging and discussing plan of care with patient as well as directly providing post-discharge instructions. Additional time then spent on discharge activities. Discharge Medications:  See after visit summary for reconciled discharge medications provided to patient and/or family.       **Please Note: This note may have been constructed using a voice recognition system**

## 2023-07-30 NOTE — UTILIZATION REVIEW
Initial Clinical Review    WAS OBSERVATION 07/28/2023 @ 1318 CONVERTED TO INPATIENT ADMISSION 07/28/2023 @ 1413 DUE TO CONTINUED STAY REQUIRED TO CARE FOR PATIENT WITH Syncope. Admission: Date/Time/Statement:   Admission Orders (From admission, onward)     Ordered        07/28/23 1413  Inpatient Admission  Once            07/28/23 1318  Place in Observation  Once                      Orders Placed This Encounter   Procedures   • Inpatient Admission     Standing Status:   Standing     Number of Occurrences:   1     Order Specific Question:   Level of Care     Answer:   Med Surg [16]     Order Specific Question:   Estimated length of stay     Answer:   More than 2 Midnights     Order Specific Question:   Certification     Answer:   I certify that inpatient services are medically necessary for this patient for a duration of greater than two midnights. See H&P and MD Progress Notes for additional information about the patient's course of treatment. ED Arrival Information     Expected   -    Arrival   7/28/2023 09:28    Acuity   Urgent            Means of arrival   Walk-In    Escorted by   Family Member    Service   Hospitalist    Admission type   Emergency            Arrival complaint   passed out           Chief Complaint   Patient presents with   • Syncope     Pt was sitting at desk when he said " I cant see the screen" and passed out per wife. Pt did not fall. Pt was out for about 5minutes and would not arise per wife. Pt denies any complaints. Initial Presentation: 82 y.o. male , presented to the ED @ [address] [place] [address], from home via walk in with family member. Admitted as Observation due to Syncope. Date: 07/28/2023   He was working on his laptop at his desk at around 8 AM this morning when he felt blurry vision for 3 to 4 minutes. This was followed by a syncopal event witnessed by his wife lasting for 1 to 2 minutes.   He has been constipated for the past 6 days and had a bowel movement last night after regimen prescribed by his primary care physician. CTA head and neck -  Mildly enlarged pituitary gland with thickened pituitary stalk. Differential includes hypophysitis, pituitary macroadenoma, and less likely metastasis (given history of melanoma). Monitor on telemetry. Obtain ECHO. Check Orthostatic vital signs. Rec 1L NSS. Recheck Na. Obtain MRI brain. Day 2: 07/29/2023  Monitor BP. Continue IV flds. Trend BMP. Follow up MRI brain. Consult Neuro. Telemetry. Consult Endocrine. Date: 07/30/2023. Hospital Day 3: Has surpassed a 2nd midnight with active treatments and services, which include:  MRI brain with Pituitary done here showing 2 cm sellar mass abutting the optic chiasm and right optic nerve. AM cortisol mildly low, TSH/T4 normal, Prolactin normal.  ACTH, IGF pending. Discussed with Endocrine - can follow up with complete work up outpatient. Discussed with neurology - MRI pituitary protocol, consult neurosurgery.     07/30/2023  Consult NeuroSurgery:  Assessment and Recommendations  • Continue to monitor neuro exam.   • Recommend obtaining the following remaining pituitary labs. ? Alpha Subunit, Free  ? Follicle stimulating hormone  ? Growth hormone  ? Luteinizing hormone  ? T3, free  • Recommend follow up with endocrinology. If pt not already seeing endocrinology recommend placing ambulatory referral to Endo. • Given that the pit macroadenoma abuts the optic chiasm, recommend patient that obtain formal visual field testing. These are typically done outpatient. ? If pt already sees an ophthalmologist he can contact them to see they do formal visual field testing, if they do to schedule a STAT (as in the next 1-3 weeks) formal VF testing. Recommend pt be seen soon given the transient blurry vision. ? In case they don't, referral to Dr. Aide Rico was provided.  If possible, pt can obtain his printed records of the testing or contact neurosurgery office to have them faxed to [facility]. Neurosurgery contact infor already placed in the d/c summary section. Given that pt is stable with resolved sx, okay to d/c after obtaining the labs above. Patient will have a follow-up appointment with neurosurgery within the next 2-3 weeks after obtaining the formal visual field testing for further evaluation and discussion. Pt to contact neurosurgery with any questions or concerns in the interim.      ED Triage Vitals   Temperature Pulse Respirations Blood Pressure SpO2   07/28/23 0945 07/28/23 0938 07/28/23 0938 07/28/23 0938 07/28/23 0938   97.8 °F (36.6 °C) 70 18 156/81 98 %      Temp Source Heart Rate Source Patient Position - Orthostatic VS BP Location FiO2 (%)   07/28/23 0945 07/28/23 1200 07/28/23 1423 07/28/23 1423 --   Oral Monitor Lying Right arm       Pain Score       07/28/23 1518       No Pain          Wt Readings from Last 1 Encounters:   07/29/23 77.5 kg (170 lb 13.7 oz)     Additional Vital Signs:      Date/Time Temp Pulse Resp BP MAP (mmHg) SpO2 O2 Device Patient Position - Orthostatic VS   07/30/23 08:15:38 98.1 °F (36.7 °C) 69 14 132/80 97 98 % -- --   07/30/23 08:15:10 98.1 °F (36.7 °C) 70 14 132/80 97 98 % -- --   07/30/23 0800 -- -- -- -- -- 98 % None (Room air) --   07/29/23 20:09:03 97.8 °F (36.6 °C) 74 -- 132/78 96 94 % -- --   07/29/23 15:20:50 97.9 °F (36.6 °C) 79 15 130/84 99 95 % -- --   07/29/23 08:11:10 98.3 °F (36.8 °C) 85 15 129/85 100 97 % -- --   07/29/23 0800 -- -- -- -- -- 97 % None (Room air) --   07/28/23 21:37:11 98.5 °F (36.9 °C) 74 -- 120/64 83 93 % -- --   07/28/23 15:18:51 98 °F (36.7 °C) 72 -- 136/88 104 92 % None (Room air) --   07/28/23 1425 -- 85 20 129/67 -- -- -- Standing - Orthostatic VS   07/28/23 1424 -- 80 20 123/78 -- -- -- Sitting - Orthostatic VS   07/28/23 1423 -- 80 20 122/80 -- 98 % None (Room air) Lying   07/28/23 1200 -- 70 20 -- -- 98 % None (Room air) --   07/28/23 0945 97.8 °F (36.6 °C) 66 20 156/81 112 100 % -- -- Date and Time Eye Opening Best Verbal Response Best Motor Response [place] Coma Scale Score   07/30/23 0800 4 5 6 15   07/29/23 2100 4 5 6 15   07/29/23 0800 4 5 6 15   07/28/23 1900 4 5 6 15   07/28/23 1518 4 5 6 15   07/28/23 0951 4 5 6 15           Pertinent Labs/Diagnostic Test Results:   MRI brain pituitary wo and w contrast   Final Result by Zane Rodriguez DO (07/29 1307)   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 This abuts the undersurface of the optic chiasm and proximal optic nerve on the right. Minor white matter change within the brain parenchyma representing mild chronic microangiopathy. CTA head and neck with and without contrast   Final Result by Zeynep Vazquez MD (07/28 1334)   No acute infarct or intracranial hemorrhage. Mildly enlarged pituitary gland with thickened pituitary stalk. Differential includes hypophysitis, pituitary macroadenoma, and less likely metastasis (given history of melanoma). Recommend correlation with pituitary hormone levels and review of    medication history. Recommend follow-up MRI brain pituitary protocol with and without contrast.      Negative CTA head and neck for large vessel occlusion, dissection, aneurysm, or high-grade stenosis.         Results from last 7 days   Lab Units 07/30/23  0443 07/29/23  0817 07/28/23  0950   WBC Thousand/uL 5.23 5.89 7.48   HEMOGLOBIN g/dL 12.3 13.0 13.5   HEMATOCRIT % 36.7 38.0 39.0   PLATELETS Thousands/uL 232 237 262   NEUTROS ABS Thousands/µL 2.08 2.53 4.00     Results from last 7 days   Lab Units 07/30/23  0443 07/29/23  0817 07/28/23  0950   SODIUM mmol/L 133* 131* 129*   POTASSIUM mmol/L 3.8 3.9 4.1   CHLORIDE mmol/L 102 99 95*   CO2 mmol/L 27 26 29   ANION GAP mmol/L 4 6 5   BUN mg/dL 17 15 17   CREATININE mg/dL 0.89 0.98 1.02   EGFR ml/min/1.73sq m 80 72 68   CALCIUM mg/dL 8.8 9.0 9.4   MAGNESIUM mg/dL  --  2.1  --      Results from last 7 days   Lab Units 07/28/23  0950   AST U/L 19   ALT U/L 8   ALK PHOS U/L 57   TOTAL PROTEIN g/dL 7.0   ALBUMIN g/dL 4.3   TOTAL BILIRUBIN mg/dL 0.50     Results from last 7 days   Lab Units 07/28/23  0951   POC GLUCOSE mg/dl 112     Results from last 7 days   Lab Units 07/30/23  0443 07/29/23  0817 07/28/23  0950   GLUCOSE RANDOM mg/dL 84 81 100     Results from last 7 days   Lab Units 07/28/23  1201 07/28/23  0950   HS TNI 0HR ng/L  --  <2   HS TNI 2HR ng/L <2  --      Results from last 7 days   Lab Units 07/29/23  0817   TSH 3RD GENERATON uIU/mL 0.794     Results from last 7 days   Lab Units 07/29/23  0817   PROLACTIN ng/mL 10.45     ED Treatment:   Medication Administration from 07/28/2023 0928 to 07/28/2023 1456      07/28/2023 0957 EDT sodium chloride 0.9 % bolus 1,000 mL 1,000 mL Intravenous New Bag Adama Davila RN --     07/28/2023 1309 EDT iohexol (OMNIPAQUE) 350 MG/ML injection (SINGLE-DOSE) 100 mL 85 mL Intravenous Given [facility] per protocol        Past Medical History:   Diagnosis Date   • Disease of thyroid gland    • Melanoma ([address])     Underneath foot     Present on Admission:  • Syncope  • 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 male  Admission Orders:  Regular diet  Up & OOB as tolerated  Orthostatic BP    Scheduled Medications:  heparin (porcine), 5,000 Units, Subcutaneous, Q8H [address]  ibuprofen, 400 mg, Oral, Once  levothyroxine, 75 mcg, Oral, Early Morning      Continuous IV Infusions:  sodium chloride, 75 mL/hr, Intravenous, Continuous      PRN Meds:       IP CONSULT TO NEUROLOGY  IP CONSULT TO ENDOCRINOLOGY  IP CONSULT TO NEUROSURGERY    Network Utilization Review Department  ATTENTION: Please call with any questions or concerns to [phone] and carefully listen to the prompts so that you are directed to the right person. All voicemails are confidential.  Mira Munson all requests for admission clinical reviews, approved or denied determinations and any other requests to dedicated fax number below belonging to the campus where the patient is receiving treatment.  List of dedicated fax numbers for the Facilities:  [place] DENIALS (Administrative/Medical Necessity) [phone] [address] [facility] (Maternity/NICU/Pediatrics) [phone]   [address] [place] [address] Drive [phone]   [facility] [address] [facility] [phone]   [facility] [address] [place] [address] [phone]   [address] [address] [phone]

## 2023-07-30 NOTE — ASSESSMENT & PLAN NOTE
· Na 129  · Received 1 L normal saline  · Improved to 133   · Follow up with Endocrine outpatient   · Avoid laxatives

## 2023-07-30 NOTE — ASSESSMENT & PLAN NOTE
· Was taken off medications few years ago when he lost weight BP as outpatient was acceptable off medications  · No addition to medications at this time

## 2023-07-30 NOTE — ASSESSMENT & PLAN NOTE
· Syncopal event this morning at around 8 am while sitting at his desk lasting a few minutes preceded by blurred vision for 3-4 minutes   · CTA head and neck with mildly enlarged pituitary gland with thickened pituitary stalk. Differential includes hypophysitis, pituitary macroadenoma, and less likely metastasis (given history of melanoma).    · Patient feels back to normal and wants to go home   · Telemetry without abnormality   · Stable for discharge   · Outpatient ECHO reasonable - ordered, patient will follow up with PCP   · Plan for Pituitary Macroadenoma below

## 2023-07-30 NOTE — DISCHARGE INSTR - AVS FIRST PAGE
Neurosurgery discharge instructions. You were found to have a mass in your brain in the pituitary gland. Please follow up with Ophthalmology within the next 1-3 weeks to obtain a formal visual field testing. If you have your ophthalmologist please contact them to find out if they do formal visual field testing and if they can schedule an appointment for you. As an alternative we provided your a referral to Ophthalmologist who does the formal visual field testing - Dr. Leoncio Chen- contact his office to schedule the testing. Also follow-up with endocrinology. Please follow up with the neurosurgery office after obtaining the formal visual testing for further evaluation and discussion. Continue to follow up with your primary care provider for general health and we recommend healthy choices. Should you experience any new or worsening headache, dizziness, lightheadedness, visual disturbance, weakness, numbness, tingling, nausea or vomiting, change in mental status,  based on severity, please go to the Emergency room to get evaluated or contact our office immediately. Please contact neurosurgery with any questions or concerns at . Medicine Discharge Instructions Below   Dear Delgado Barclay,     It was our pleasure to care for you here at [facility]. It is our hope that we were always able to exceed the expected standards for your care during your stay. You were hospitalized due to syncope (passing out) and was found to have a mass on your pituitary gland suspicious for a pituitary macroadenoma. You were cared for on the 3rd floor by Amadeo Land PA-C under the service of Jaymie Jj MD with the [facility] who covers for your primary care physician (PCP), Eddi Vieira MD, while you were hospitalized. If you have any questions or concerns related to this hospitalization, you may contact us at [phone].   For follow up as well as any medication refills, we recommend that you follow up with your primary care physician. A registered nurse will reach out to you by phone within a few days after your discharge to answer any additional questions that you may have after going home. However, at this time we provide for you here, the most important instructions / recommendations at discharge:     Notable Medication Adjustments -   None, continue home medications as you had been taking them but would avoid further laxatives   Testing Required after Discharge -   Echocardiogram - ultrasound of your heart. I ordered this, but you should talk to your family doctor about getting the final results and further follow up instructions   Visual Field Testing - As above, you should see Dr. Vini Suresh at [facility] to have this done and then take the printed results with you to your appointment with Neurosurgery   ** Please contact your PCP to request testing orders for any of the testing recommended here **  Important follow up information -   Follow up with family doctor in 1-2 weeks  Follow up with Endocrinology - they manage hormones. Their office will call to make a follow up appointment   Follow up with ophthalmology (Dr. Vini Suresh). Neurosurgery placed this referral in your chart. They will likely call with an appointment, but I would call their office tomorrow to get an appointment ASAP. Follow up with Neurosurgery after you see Dr. Vini Suresh with Visual Field testing. They will call you to schedule follow up  Other Instructions -   Do not drive until you see Ophthalmology (Dr. Vini Suresh) and Neurosurgery   Please review this entire after visit summary as additional general instructions including medication list, appointments, activity, diet, any pertinent wound care, and other additional recommendations from your care team that may be provided for you.       Sincerely,     Jose Payne PA-C

## 2023-07-30 NOTE — PLAN OF CARE
Problem: PAIN - ADULT  Goal: Verbalizes/displays adequate comfort level or baseline comfort level  Description: Interventions:  - Encourage patient to monitor pain and request assistance  - Assess pain using appropriate pain scale  - Administer analgesics based on type and severity of pain and evaluate response  - Implement non-pharmacological measures as appropriate and evaluate response  - Consider cultural and social influences on pain and pain management  - Notify physician/advanced practitioner if interventions unsuccessful or patient reports new pain  Outcome: Progressing     Problem: INFECTION - ADULT  Goal: Absence or prevention of progression during hospitalization  Description: INTERVENTIONS:  - Assess and monitor for signs and symptoms of infection  - Monitor lab/diagnostic results  - Monitor all insertion sites, i.e. indwelling lines, tubes, and drains  - Monitor endotracheal if appropriate and nasal secretions for changes in amount and color  - [place] appropriate cooling/warming therapies per order  - Administer medications as ordered  - Instruct and encourage patient and family to use good hand hygiene technique  - Identify and instruct in appropriate isolation precautions for identified infection/condition  Outcome: Progressing

## 2023-07-30 NOTE — PLAN OF CARE
Problem: Potential for Falls  Goal: Patient will remain free of falls  Description: INTERVENTIONS:  - Educate patient/family on patient safety including physical limitations  - Instruct patient to call for assistance with activity   - Consult OT/PT to assist with strengthening/mobility   - Keep Call bell within reach  - Keep bed low and locked with side rails adjusted as appropriate  - Keep care items and personal belongings within reach  - Initiate and maintain comfort rounds  - Make Fall Risk Sign visible to staff  - Offer Toileting every  Hours, in advance of need  - Initiate/Maintain alarm  - Obtain necessary fall risk management equipment:  - Apply yellow socks and bracelet for high fall risk patients  - Consider moving patient to room near nurses station  Outcome: Progressing     Problem: PAIN - ADULT  Goal: Verbalizes/displays adequate comfort level or baseline comfort level  Description: Interventions:  - Encourage patient to monitor pain and request assistance  - Assess pain using appropriate pain scale  - Administer analgesics based on type and severity of pain and evaluate response  - Implement non-pharmacological measures as appropriate and evaluate response  - Consider cultural and social influences on pain and pain management  - Notify physician/advanced practitioner if interventions unsuccessful or patient reports new pain  Outcome: Progressing     Problem: INFECTION - ADULT  Goal: Absence or prevention of progression during hospitalization  Description: INTERVENTIONS:  - Assess and monitor for signs and symptoms of infection  - Monitor lab/diagnostic results  - Monitor all insertion sites, i.e. indwelling lines, tubes, and drains  - Monitor endotracheal if appropriate and nasal secretions for changes in amount and color  - [place] appropriate cooling/warming therapies per order  - Administer medications as ordered  - Instruct and encourage patient and family to use good hand hygiene technique  - Identify and instruct in appropriate isolation precautions for identified infection/condition  Outcome: Progressing  Goal: Absence of fever/infection during neutropenic period  Description: INTERVENTIONS:  - Monitor WBC    Outcome: Progressing     Problem: SAFETY ADULT  Goal: Patient will remain free of falls  Description: INTERVENTIONS:  - Educate patient/family on patient safety including physical limitations  - Instruct patient to call for assistance with activity   - Consult OT/PT to assist with strengthening/mobility   - Keep Call bell within reach  - Keep bed low and locked with side rails adjusted as appropriate  - Keep care items and personal belongings within reach  - Initiate and maintain comfort rounds  - Make Fall Risk Sign visible to staff  - Offer Toileting every  Hours, in advance of need  - Initiate/Maintain alarm  - Obtain necessary fall risk management equipment:  - Apply yellow socks and bracelet for high fall risk patients  - Consider moving patient to room near nurses station  Outcome: Progressing  Goal: Maintain or return to baseline ADL function  Description: INTERVENTIONS:  -  Assess patient's ability to carry out ADLs; assess patient's baseline for ADL function and identify physical deficits which impact ability to perform ADLs (bathing, care of mouth/teeth, toileting, grooming, dressing, etc.)  - Assess/evaluate cause of self-care deficits   - Assess range of motion  - Assess patient's mobility; develop plan if impaired  - Assess patient's need for assistive devices and provide as appropriate  - Encourage maximum independence but intervene and supervise when necessary  - Involve family in performance of ADLs  - Assess for home care needs following discharge   - Consider OT consult to assist with ADL evaluation and planning for discharge  - Provide patient education as appropriate  Outcome: Progressing  Goal: Maintains/Returns to pre admission functional level  Description: INTERVENTIONS:  - Perform BMAT or MOVE assessment daily.   - Set and communicate daily mobility goal to care team and patient/family/caregiver. - Collaborate with rehabilitation services on mobility goals if consulted  - Perform Range of Motion  times a day. - Reposition patient every  hours. - Dangle patient  times a day  - Stand patient  times a day  - Ambulate patient  times a day  - Out of bed to chair  times a day   - Out of bed for meals times a day  - Out of bed for toileting  - Record patient progress and toleration of activity level   Outcome: Progressing     Problem: DISCHARGE PLANNING  Goal: Discharge to home or other facility with appropriate resources  Description: INTERVENTIONS:  - Identify barriers to discharge w/patient and caregiver  - Arrange for needed discharge resources and transportation as appropriate  - Identify discharge learning needs (meds, wound care, etc.)  - Arrange for interpretive services to assist at discharge as needed  - Refer to Case Management Department for coordinating discharge planning if the patient needs post-hospital services based on physician/advanced practitioner order or complex needs related to functional status, cognitive ability, or social support system  Outcome: Progressing     Problem: Knowledge Deficit  Goal: Patient/family/caregiver demonstrates understanding of disease process, treatment plan, medications, and discharge instructions  Description: Complete learning assessment and assess knowledge base.   Interventions:  - Provide teaching at level of understanding  - Provide teaching via preferred learning methods  Outcome: Progressing

## 2023-07-30 NOTE — ASSESSMENT & PLAN NOTE
· Presentation as above  · CTA head and neck - with mildly enlarged pituitary gland with thickened pituitary stalk  · Per patient MRI brain done on 12/18/2012 at [facility] had shown mild prominence of pituitary gland  · MRI brain with Pituitary done here showing 2 cm sellar mass abutting the optic chiasm and right optic nerve  · Patient is asymptomatic at this time   · AM cortisol mildly low, TSH/T4 normal, Prolactin normal   · ACTH, IGF pending   · Discussed with Endocrine - can follow up with complete work up outpatient   · Discussed with Neurosurgery - needs formal visual field testing, recommended additional blood work and outpatient follow up below   · Alpha Subunit, T3, FSH, GH, and LH all ordered per Neurosurgery recommendations   · Ophthalmology referral placed for visual field testing - should be done in next 1-2 weeks  · Follow up with Neurosurgery in 2-3 weeks AFTER visual field testing complete   · Follow up with Endocrine   · Appreciate Neurosurgery and Endocrinology input   · Stable for discharge   · Discussed with endocrine - MRI pituitary protocol, and anterior pituitary hormonal panel ordered  · Discussed with neurology - MRI pituitary protocol, consult neurosurgery

## 2023-07-30 NOTE — TELEMEDICINE
e-Consult (IPC)     Inpatient consult to Neurosurgery  Consult performed by: Nahid Davis PA-C  Consult ordered by: Ronak Arauz MD         Contacted by Dr. Jocelyn Arora 82 y.o. male MRN: [id number]  Unit/Bed#: -01 Encounter: [id number]    Reason for Consult    Per provider report, patient is an 82-year-old male with past medical history of hypothyroidism, melanoma, who presented status post syncope. Patient reported that he was working on his laptop at his desk when he felt blurry vision for 3 to 4 minutes. This was followed by a syncopal event that was witnessed by his wife the lasted about 1 to 2 minutes. Patient also reported that he had been constipated for the past 6 days and had a bowel movement the night prior after taking medication that was prescribed by his physician. Today, patient reports resolution of his blurry vision. No recurrent symptoms noted. Available past medical history,social history, surgical history, medication list, drug allergies and review of systems were reviewed. /80   Pulse 69   Temp 98.1 °F (36.7 °C)   Resp 14   Ht 5' 10" (1.778 m)   Wt 77.5 kg (170 lb 13.7 oz)   SpO2 98%   BMI 24.52 kg/m²      Clinical exam per provider report, pt is noted to have full vision without visual field deficits. AAO x 3, SUN, no focal deficits noted. Imaging personally reviewed. · MRI brain pituitary with without contrast 7/29/2023: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 This abuts the undersurface of the optic chiasm and proximal optic nerve on the right.   · Minor white matter change within the brain parenchyma representing mild chronic microangiopathy. Pituitary labs reviewed. Assessment and Recommendations  · Continue to monitor neuro exam.   · Recommend obtaining the following remaining pituitary labs.    · Alpha Subunit, Free  · Follicle stimulating hormone  · Growth hormone  · Luteinizing hormone  · T3, free  · Recommend follow up with endocrinology. If pt not already seeing endocrinology recommend placing ambulatory referral to Endo. · Given that the pit macroadenoma abuts the optic chiasm, recommend patient that obtain formal visual field testing. These are typically done outpatient. · If pt already sees an ophthalmologist he can contact them to see they do formal visual field testing, if they do to schedule a STAT (as in the next 1-3 weeks) formal VF testing. Recommend pt be seen soon given the transient blurry vision. · In case they don't, referral to Dr. Louise Patel was provided. If possible, pt can obtain his printed records of the testing or contact neurosurgery office to have them faxed to [facility]. Neurosurgery contact infor already placed in the d/c summary section. · Given that pt is stable with resolved sx, okay to d/c after obtaining the labs above. Patient will have a follow-up appointment with neurosurgery within the next 2-3 weeks after obtaining the formal visual field testing for further evaluation and discussion. Pt to contact neurosurgery with any questions or concerns in the interim. All questions answered. Provider is in agreement with the course of action. 21-30 minutes, >50% of the total time devoted to medical consultative verbal/EMR discussion between providers. Written report will be generated in the EMR.

## 2023-07-31 ENCOUNTER — TELEPHONE (OUTPATIENT)
Dept: NEUROSURGERY | Facility: CLINIC | Age: 83
End: 2023-07-31

## 2023-07-31 ENCOUNTER — TELEPHONE (OUTPATIENT)
Dept: ENDOCRINOLOGY | Facility: CLINIC | Age: 83
End: 2023-07-31

## 2023-07-31 NOTE — TELEPHONE ENCOUNTER
08/01/2023-CALLED PT, WHO STATES HE IS SCHEDULED W/DR [facility] OFFICE ON 08/07/2023. ALSO CONFIRMED 08/08/2023 [facility]    07/31/2023-PT DISCHARGED TO HOME      07/30/2023-Pearl Lim PA-C  Completed teleconsult. Please arrange solo apt in the next 2-3 week with [facility], [facility] or [facility] after obtaining formal visual field testing. Please contact patient to see when he is scheduled to have formal visual field testing either by his ophthalmologist or he was also provided a referral to Dr. Armando Dill. Schedule the apt after this apt. He was also provided referral to endo but he can see us before endo.

## 2023-07-31 NOTE — UTILIZATION REVIEW
NOTIFICATION OF ADMISSION DISCHARGE   This is a Notification of Discharge from [address] [facility] [address] Please be advised that this patient has been discharge from our facility. Below you will find the admission and discharge date and time including the patient’s disposition. UTILIZATION REVIEW CONTACT:  Navarro Arthur  Utilization   Network Utilization Review Department  Phone: [phone] carefully listen to the prompts. All voicemails are confidential.  Email: [email or web address]     ADMISSION INFORMATION  PRESENTATION DATE: 7/28/2023  9:31 AM  OBERVATION ADMISSION DATE:   INPATIENT ADMISSION DATE: 7/28/23  2:13 PM   DISCHARGE DATE: 7/30/2023 12:48 PM   DISPOSITION:Home/Self Care    IMPORTANT INFORMATION:  Send all requests for admission clinical reviews, approved or denied determinations and any other requests to dedicated fax number below belonging to the campus where the patient is receiving treatment.  List of dedicated fax numbers:  [place] DENIALS (Administrative/Medical Necessity) [phone] [address] [facility] (Maternity/NICU/Pediatrics) [phone]   [facility] [phone]   [facility] [phone] [address] [facility] [phone]   [address] [place], [place] [phone]   [facility] [phone]   [address] [place] [address] [address] [place] [address] [phone]   [address] [place] [address] [phone]   [address] [facility] [phone]   [address] [facility] [address] [phone]

## 2023-08-01 ENCOUNTER — TELEPHONE (OUTPATIENT)
Dept: NEUROSURGERY | Facility: CLINIC | Age: 83
End: 2023-08-01

## 2023-08-01 LAB — ACTH PLAS-MCNC: 11.6 PG/ML (ref 7.2–63.3)

## 2023-08-01 NOTE — TELEPHONE ENCOUNTER
Patient lvm on call center on 7/31 @ 2:51PM    Called patient back and scheduled HFU after VF (sched 8/7) patient is aware to bring Report. w LH @ [facility] for 8/8 @ 11:30AM ( gave detail office information) patient requested soonest appt

## 2023-08-02 LAB
GH SERPL-MCNC: 0.1 NG/ML (ref 0–10)
IGF-I SERPL-MCNC: 24 NG/ML (ref 40–194)

## 2023-08-03 ENCOUNTER — OFFICE VISIT (OUTPATIENT)
Dept: ENDOCRINOLOGY | Facility: CLINIC | Age: 83
End: 2023-08-03
Payer: COMMERCIAL

## 2023-08-03 VITALS
HEART RATE: 90 BPM | WEIGHT: 175.6 LBS | DIASTOLIC BLOOD PRESSURE: 90 MMHG | BODY MASS INDEX: 25.14 KG/M2 | SYSTOLIC BLOOD PRESSURE: 130 MMHG | HEIGHT: 70 IN

## 2023-08-03 DIAGNOSIS — R79.89 LOW IGF-1 LEVEL: ICD-10-CM

## 2023-08-03 DIAGNOSIS — D35.2 PITUITARY MACROADENOMA (HCC): Primary | ICD-10-CM

## 2023-08-03 DIAGNOSIS — E03.9 ACQUIRED HYPOTHYROIDISM: Chronic | ICD-10-CM

## 2023-08-03 PROCEDURE — 99214 OFFICE O/P EST MOD 30 MIN: CPT | Performed by: INTERNAL MEDICINE

## 2023-08-03 RX ORDER — LEVOTHYROXINE SODIUM 0.07 MG/1
75 TABLET ORAL DAILY
Qty: 30 TABLET | Refills: 0 | Status: SHIPPED | OUTPATIENT
Start: 2023-08-03

## 2023-08-03 NOTE — ASSESSMENT & PLAN NOTE
He will be undergoing visual field testing next week. Anterior pituitary hormonal panel showed low IGF-I as well as low gonadotropins along with low a.m. cortisol . For now we will repeat labs including checking a dilutional prolactin. Check a.m. cortisol, ACTH, DHEA-sulfate and testosterone.   If cortisol is still low consider starting hydrocortisone for secondary adrenal insufficiency

## 2023-08-03 NOTE — PROGRESS NOTES
Delgado Barclay 82 y.o. male MRN: [id number]    Encounter: [id number]      Assessment/Plan     Problem List Items Addressed This Visit        Endocrine    Hypothyroidism (Chronic)     Continue levothyroxine at current dose         Relevant Medications    levothyroxine 75 mcg tablet    Pituitary macroadenoma ([address]) - Primary     He will be undergoing visual field testing next week. Anterior pituitary hormonal panel showed low IGF-I as well as low gonadotropins along with low a.m. cortisol . For now we will repeat labs including checking a dilutional prolactin. Check a.m. cortisol, ACTH, DHEA-sulfate and testosterone. If cortisol is still low consider starting hydrocortisone for secondary adrenal insufficiency         Relevant Orders    Cortisol Level,7-9 AM Specimen    Prolactin, Dilution Study    ACTH- Lab Collect    Testosterone, free, total Lab Collect    DHEA-sulfate Lab Collect   Other Visit Diagnoses     Low IGF-1 level              CC:   Pituitary macroadenoma    History of Present Illness     HPI:  82-year-old male who was recently hospitalized for syncopal episode and during work-up was found to have a pituitary macroadenoma. He denies any headaches or vision changes.   Recalls being told more than 10 years back that he had some pituitary abnormality on the imaging however was not advised to have any follow-up imaging    Diagnosed with hypothyroidism a few years back and started on LT4  75 mcg daily -he had fatigue and weight gain prior to starting levothyroxine and felt that symptoms improved after he started levothyroxine    Weight loss- 40 lbs in the past 5 years intentionally  Denies any dizziness or lightheadedness      Review of Systems    Historical Information   Past Medical History:   Diagnosis Date   • Disease of thyroid gland    • Melanoma ([address])     Underneath foot     Past Surgical History:   Procedure Laterality Date   • COLONOSCOPY  07/31/2009   • COLONOSCOPY  07/31/2009    Hyperplastic colon polyp, mild diverticulosis in the sigmoid and internal hemorrhoids. Ten year recall advised. Social History   Social History     Substance and Sexual Activity   Alcohol Use Yes     Social History     Substance and Sexual Activity   Drug Use Not on file     Social History     Tobacco Use   Smoking Status Never   Smokeless Tobacco Never     Family History:   Family History   Problem Relation Age of Onset   • Colon polyps Neg Hx    • Colon cancer Neg Hx        Meds/Allergies   Current Outpatient Medications   Medication Sig Dispense Refill   • levothyroxine 75 mcg tablet Take 1 tablet (75 mcg total) by mouth daily 30 tablet 0     No current facility-administered medications for this visit. No Known Allergies    Objective   Vitals: Blood pressure 130/90, pulse 90, height 5' 10" (1.778 m), weight 79.7 kg (175 lb 9.6 oz). Physical Exam  Vitals reviewed. Constitutional:       General: He is not in acute distress. Appearance: Normal appearance. He is not ill-appearing, toxic-appearing or diaphoretic. HENT:      Head: Normocephalic and atraumatic. Eyes:      General: No scleral icterus. Extraocular Movements: Extraocular movements intact. Cardiovascular:      Rate and Rhythm: Normal rate and regular rhythm. Heart sounds: Normal heart sounds. No murmur heard. Pulmonary:      Effort: Pulmonary effort is normal. No respiratory distress. Breath sounds: Normal breath sounds. No wheezing or rales. Abdominal:      General: There is no distension. Palpations: Abdomen is soft. Tenderness: There is no abdominal tenderness. Musculoskeletal:      Cervical back: Neck supple. Right lower leg: No edema. Left lower leg: No edema. Lymphadenopathy:      Cervical: No cervical adenopathy. Skin:     General: Skin is warm and dry. Neurological:      General: No focal deficit present. Mental Status: He is alert and oriented to person, place, and time.       Gait: Gait normal.   Psychiatric:         Mood and Affect: Mood normal.         Behavior: Behavior normal.         Thought Content: Thought content normal.         Judgment: Judgment normal.         The history was obtained from the review of the chart, patient. Lab Results:   Lab Results   Component Value Date/Time    Potassium 3.8 07/30/2023 04:43 AM    Potassium 3.9 07/29/2023 08:17 AM    Potassium 4.1 07/28/2023 09:50 AM    Chloride 102 07/30/2023 04:43 AM    Chloride 99 07/29/2023 08:17 AM    Chloride 95 (L) 07/28/2023 09:50 AM    CO2 27 07/30/2023 04:43 AM    CO2 26 07/29/2023 08:17 AM    CO2 29 07/28/2023 09:50 AM    BUN 17 07/30/2023 04:43 AM    BUN 15 07/29/2023 08:17 AM    BUN 17 07/28/2023 09:50 AM    Creatinine 0.89 07/30/2023 04:43 AM    Creatinine 0.98 07/29/2023 08:17 AM    Creatinine 1.02 07/28/2023 09:50 AM    Calcium 8.8 07/30/2023 04:43 AM    Calcium 9.0 07/29/2023 08:17 AM    Calcium 9.4 07/28/2023 09:50 AM    eGFR 80 07/30/2023 04:43 AM    eGFR 72 07/29/2023 08:17 AM    eGFR 68 07/28/2023 09:50 AM    TSH 3RD GENERATON 0.794 07/29/2023 08:17 AM    Free T4 0.84 07/29/2023 08:17 AM    Prolactin 10.45 07/29/2023 08:17 AM    FSH 4.4 07/30/2023 11:20 AM    LH 0.8 (L) 07/30/2023 11:20 AM             Imaging Studies:  Results for orders placed during the hospital encounter of 07/28/23    MRI brain pituitary wo and w contrast    Impression  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 This abuts the undersurface of the optic chiasm and proximal optic nerve on the right. Minor white matter change within the brain parenchyma representing mild chronic microangiopathy. Workstation performed: [id number]         I have personally reviewed pertinent reports. Portions of the record may have been created with voice recognition software.  Occasional wrong word or "sound a like" substitutions may have occurred due to the inherent limitations of voice recognition software. Read the chart carefully and recognize, using context, where substitutions have occurred.

## 2023-08-07 ENCOUNTER — NEW PATIENT (OUTPATIENT)
Dept: URBAN - METROPOLITAN AREA CLINIC 6 | Facility: CLINIC | Age: 83
End: 2023-08-07

## 2023-08-07 DIAGNOSIS — D44.3: ICD-10-CM

## 2023-08-07 DIAGNOSIS — Z96.1: ICD-10-CM

## 2023-08-07 PROCEDURE — 99204 OFFICE O/P NEW MOD 45 MIN: CPT

## 2023-08-07 PROCEDURE — 92083 EXTENDED VISUAL FIELD XM: CPT

## 2023-08-07 ASSESSMENT — VISUAL ACUITY
OS_CC: 20/20
OD_CC: 20/20

## 2023-08-07 ASSESSMENT — TONOMETRY
OD_IOP_MMHG: 20
OS_IOP_MMHG: 17

## 2023-08-08 ENCOUNTER — OFFICE VISIT (OUTPATIENT)
Dept: NEUROSURGERY | Facility: CLINIC | Age: 83
End: 2023-08-08
Payer: COMMERCIAL

## 2023-08-08 VITALS
RESPIRATION RATE: 16 BRPM | SYSTOLIC BLOOD PRESSURE: 130 MMHG | HEIGHT: 70 IN | WEIGHT: 175 LBS | HEART RATE: 80 BPM | BODY MASS INDEX: 25.05 KG/M2 | DIASTOLIC BLOOD PRESSURE: 82 MMHG | TEMPERATURE: 97.9 F

## 2023-08-08 DIAGNOSIS — E23.7 PITUITARY LESION (HCC): Primary | ICD-10-CM

## 2023-08-08 PROCEDURE — 99204 OFFICE O/P NEW MOD 45 MIN: CPT | Performed by: NEUROLOGICAL SURGERY

## 2023-08-08 NOTE — PROGRESS NOTES
Neurosurgery Office Note  Edith Orourke 82 y.o. male MRN: [id number]      Assessment/Plan        Problem List Items Addressed This Visit       Visit Diagnoses     Pituitary lesion [facility])    -  Primary    Relevant Orders    BUN    Creatinine, serum    MRI brain pituitary wo and w contrast            Discussion:  Patient is an 28-year-old male with h/o melanoma, HTN, hypothyroidism who was hospitalized on 7/28/2023 after transient syncopal episode, found incidentally to have a sellar mass during work-up. Of note, he and his wife knew about a "pituitary lesion" since 2013, when he was hospitalized at [facility] on 1/14/13 for memory loss and confusion. MRI brain without contrast at that time reported "mild prominence in size of pituitary gland". Not on AC/AP. Non-smoker. MRI brain pituitary protocol on 7/29/2023 demonstrated a 2 cm sellar mass with small lobular component extending suprasellar to the right, without cavernous sinus invasion or significant compression of optic chiasm or bilateral optic nerves (just abutting the undersurface of chiasm and right optic nerve). Today, he reports no new visual field deficits, which was confirmed by a formal ophthalmological evaluation recently. VFF today on my exam.    Baseline pituitary labs obtained during hospitalization unremarkable. At this time, recommend surveillance of the sellar mass with repeat MRI brain pituitary protocol in 6 months with follow-up ([address]). All questions and concerns were addressed during this visit. CHIEF COMPLAINT    Chief Complaint   Patient presents with   • Follow-up     HFU as per [employer] teleconsult 7/30-- after VF       HISTORY    History of Present Illness     82y.o. year old male     HPI    See Discussion    REVIEW OF SYSTEMS    Review of Systems   Constitutional: Positive for fever (one episode of fever like feeling also with a burning sensation over entire body about 2 nights ago). HENT: Negative.     Eyes: Negative. Wears glasses   Respiratory: Negative. Cardiovascular: Negative. Gastrointestinal: Positive for constipation. Endocrine: Negative. Genitourinary: Positive for frequency (nocturia x1). Musculoskeletal: Positive for gait problem (only due to associated with episode today of dizziness). Skin: Negative. Allergic/Immunologic: Positive for environmental allergies (hay fever, mild). Neurological: Positive for dizziness. Negative for seizures, syncope (one episode due to low sodium), weakness, numbness and headaches. Episode started this morning of pressure back of head and dizziness   Hematological: Negative. Psychiatric/Behavioral: Negative. Meds/Allergies     Current Outpatient Medications   Medication Sig Dispense Refill   • levothyroxine 75 mcg tablet Take 1 tablet (75 mcg total) by mouth daily 30 tablet 0     No current facility-administered medications for this visit. No Known Allergies    PAST HISTORY    Past Medical History:   Diagnosis Date   • Disease of thyroid gland    • Hay fever    • Melanoma ([address])     Underneath foot       Past Surgical History:   Procedure Laterality Date   • COLONOSCOPY  07/31/2009   • COLONOSCOPY  07/31/2009    Hyperplastic colon polyp, mild diverticulosis in the sigmoid and internal hemorrhoids. Ten year recall advised. Social History     Tobacco Use   • Smoking status: Never   • Smokeless tobacco: Never   Vaping Use   • Vaping Use: Never used   Substance Use Topics   • Alcohol use: Yes   • Drug use: Never       Family History   Problem Relation Age of Onset   • Colon polyps Neg Hx    • Colon cancer Neg Hx          The following portions of the patient's history were reviewed in this encounter and updated as appropriate: Past medical, surgical, family, and social history, as well as medications, allergies, and review of systems. EXAM    Vitals:Blood pressure 130/82, pulse 80, temperature 97.9 °F (36.6 °C), resp. rate 16, height 5' 10" (1.778 m), weight 79.4 kg (175 lb). ,Body mass index is 25.11 kg/m². Physical Exam  Vitals and nursing note reviewed. Constitutional:       Appearance: Normal appearance. He is normal weight. HENT:      Head: Normocephalic and atraumatic. Eyes:      Extraocular Movements: Extraocular movements intact and EOM normal.      Pupils: Pupils are equal, round, and reactive to light. Cardiovascular:      Rate and Rhythm: Normal rate and regular rhythm. Pulses: Normal pulses. Heart sounds: Normal heart sounds. Pulmonary:      Effort: Pulmonary effort is normal.      Breath sounds: Normal breath sounds. Abdominal:      General: Abdomen is flat. Palpations: Abdomen is soft. Musculoskeletal:         General: Normal range of motion. Cervical back: Normal range of motion. Neurological:      General: No focal deficit present. Mental Status: He is alert and oriented to person, place, and time. Mental status is at baseline. GCS: GCS eye subscore is 4. GCS verbal subscore is 5. GCS motor subscore is 6. Sensory: Sensation is intact. Motor: Motor strength is normal.Motor function is intact. Coordination: Coordination is intact. Gait: Gait is intact. Deep Tendon Reflexes: Reflexes are normal and symmetric. Psychiatric:         Mood and Affect: Mood normal.         Speech: Speech normal.         Behavior: Behavior normal.         Neurologic Exam     Mental Status   Oriented to person, place, and time. Attention: normal.   Speech: speech is normal   Level of consciousness: alert    Cranial Nerves     CN II   Visual fields full to confrontation. Visual acuity: normal  Right visual field deficit: none  Left visual field deficit: none     CN III, IV, VI   Pupils are equal, round, and reactive to light.   Extraocular motions are normal.   CN III: no CN III palsy  CN VI: no CN VI palsy  Nystagmus: none   Diplopia: none  Ophthalmoparesis: none  Upgaze: normal  Downgaze: normal  Conjugate gaze: present    CN V   Facial sensation intact. Right facial sensation deficit: none  Left facial sensation deficit: none    CN VII   Facial expression full, symmetric. Right facial weakness: none  Left facial weakness: none    CN VIII   CN VIII normal.     CN IX, X   CN IX normal.   CN X normal.   Palate: symmetric    CN XI   CN XI normal.   Right sternocleidomastoid strength: normal  Left sternocleidomastoid strength: normal  Right trapezius strength: normal  Left trapezius strength: normal    CN XII   CN XII normal.   Tongue deviation: none    Motor Exam   Muscle bulk: normal  Overall muscle tone: normal  Right arm pronator drift: absent  Left arm pronator drift: absent    Strength   Strength 5/5 throughout. Sensory Exam   Light touch normal.     Gait, Coordination, and Reflexes     Gait  Gait: normal    Reflexes   Reflexes 2+ except as noted. Visual fields full in all quadrants      MEDICAL DECISION MAKING    Imaging Studies:     MRI brain pituitary wo and w contrast    Result Date: 7/29/2023  Narrative: MRI BRAIN AND SELLA  WITH AND WITHOUT CONTRAST INDICATION:  Brain - pituitary protocol with and witthout contrast COMPARISON: CT and CT angiography dated 7/28/2023 TECHNIQUE: Multiplanar, multisequence imaging of the brain and sella was performed before and after gadolinium administration. Targeted images of the sella were performed requiring additional time at acquisition and interpretation of approximately 25% IV Contrast:  8 mL of Gadobutrol injection (SINGLE-DOSE) IMAGE QUALITY:  Diagnostic. FINDINGS: BRAIN PARENCHYMA:  There is no discrete mass, mass effect or midline shift. Brainstem and cerebellum demonstrate normal signal. There is no intracranial hemorrhage. There is no evidence of acute infarction and diffusion imaging is unremarkable.  Minimal  white matter hyperintensity on T2 and FLAIR imaging in the periventricular white matter consistent with minor chronic microangiopathic change. Normal postcontrast imaging. VENTRICLES:  Normal for the patient's age. SELLA AND PITUITARY GLAND: Large sellar mass filling the sella turcica and extending superiorly through the diaphragma sella into the right paramedian suprasellar cistern abutting the undersurface of the right paramedian aspect of the optic chiasm and proximal optic nerve. The pituitary stalk is displaced towards the right and posteriorly. This mass measures 2 cm in craniocaudad dimension. There is no extension into the parasellar soft tissues. ORBITS:  Normal. PARANASAL SINUSES: Mild mucosal thickening within the maxillary sinuses, left greater than right. VASCULATURE:  Evaluation of the major intracranial vasculature demonstrates appropriate flow voids. CALVARIUM AND SKULL BASE:  Normal. EXTRACRANIAL SOFT TISSUES:  Normal.     Impression: 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 This abuts the undersurface of the optic chiasm and proximal optic nerve on the right. Minor white matter change within the brain parenchyma representing mild chronic microangiopathy. Workstation performed: [id number]     CTA head and neck with and without contrast    Result Date: 7/28/2023  Narrative: CTA NECK AND BRAIN WITH AND WITHOUT CONTRAST INDICATION: syncope with vertiginous symptoms. History of melanoma. COMPARISON:   None. TECHNIQUE:  Routine CT imaging of the Brain without contrast.  Post contrast imaging was performed after administration of iodinated contrast through the neck and brain. Post contrast axial 0.625 mm images timed to opacify the arterial system. 3D rendering was performed on an independent workstation. MIP reconstructions performed. Coronal reconstructions were performed of the noncontrast portion of the brain. Radiation dose length product (DLP) for this visit:  1260.78 mGy-cm .   This examination, like all CT scans performed in the [facility], was performed utilizing techniques to minimize radiation dose exposure, including the use of iterative reconstruction and automated exposure control. IV Contrast:  85 mL of iohexol (OMNIPAQUE) IMAGE QUALITY:   Diagnostic FINDINGS: NONCONTRAST BRAIN PARENCHYMA: Decreased attenuation is noted in periventricular and subcortical white matter demonstrating an appearance that is statistically most likely to represent mild microangiopathic change. Mildly enlarged pituitary gland with thickened pituitary stalk. No CT signs of acute infarction. No parenchymal mass. No mass effect or midline shift. No acute parenchymal hemorrhage. VENTRICLES AND EXTRA-AXIAL SPACES:  Normal for the patient's age. VISUALIZED ORBITS: Sequela of bilateral cataract surgery. PARANASAL SINUSES: Mild mucosal thickening in bilateral maxillary sinuses. CERVICAL VASCULATURE AORTIC ARCH AND GREAT VESSELS:  Mild calcified atherosclerotic disease of the arch, proximal great vessels and visualized subclavian vessels. No significant stenosis. RIGHT VERTEBRAL ARTERY CERVICAL SEGMENT:  Normal origin. The vessel is normal in caliber throughout the neck. LEFT VERTEBRAL ARTERY CERVICAL SEGMENT:  Normal origin. The vessel is normal in caliber throughout the neck. RIGHT EXTRACRANIAL CAROTID SEGMENT:  Normal caliber common carotid artery. Mild noncalcified atherosclerotic disease in carotid bifurcation. Normal cervical internal carotid artery. No stenosis or dissection. LEFT EXTRACRANIAL CAROTID SEGMENT:  Normal caliber common carotid artery. Minimal calcified atherosclerotic disease in carotid bifurcation. Normal cervical internal carotid artery. No stenosis or dissection. NASCET criteria was used to determine the degree of internal carotid artery diameter stenosis. INTRACRANIAL VASCULATURE INTERNAL CAROTID ARTERIES:  Normal enhancement of the intracranial portions of the internal carotid arteries.   Normal ophthalmic artery origins. Normal ICA terminus. ANTERIOR CIRCULATION: Normal caliber right A1 segment. Mildly hypoplastic left A1 segment, normal variant. Anterior cerebral arteries with normal enhancement. Normal anterior communicating artery. MIDDLE CEREBRAL ARTERY CIRCULATION:  M1 segment and middle cerebral artery branches demonstrate normal enhancement bilaterally. DISTAL VERTEBRAL ARTERIES:  Normal distal vertebral arteries. Posterior inferior cerebellar artery origins are normal. Normal vertebral basilar junction. BASILAR ARTERY:  Basilar artery is normal in caliber. Normal superior cerebellar arteries. POSTERIOR CEREBRAL ARTERIES: Both posterior cerebral arteries arises from the basilar tip. Both arteries demonstrate normal enhancement. VENOUS STRUCTURES:  Normal given suboptimal venous contrast opacification due to arterial bolus timing. NON VASCULAR ANATOMY BONY STRUCTURES:  No acute osseous abnormality. Moderate multilevel degenerative changes of cervical spine, worse at C5-C6. Scattered dental caries and periapical lucencies suggestive of poor dentition with periodontal disease. SOFT TISSUES OF THE NECK:  Unremarkable. THORACIC INLET:  Normal.     Impression: No acute infarct or intracranial hemorrhage. Mildly enlarged pituitary gland with thickened pituitary stalk. Differential includes hypophysitis, pituitary macroadenoma, and less likely metastasis (given history of melanoma). Recommend correlation with pituitary hormone levels and review of medication history. Recommend follow-up MRI brain pituitary protocol with and without contrast. Negative CTA head and neck for large vessel occlusion, dissection, aneurysm, or high-grade stenosis. Additional chronic/incidental findings as detailed above. The study was marked in [facility] for immediate notification. Workstation performed: [id number]       I have personally reviewed pertinent reports.    and I have personally reviewed pertinent films in PACS      Jair Joyner LINO Pereira

## 2023-08-09 LAB — ALPHA SUBUNIT FREE SERPL-MCNC: 0.22 NG/ML

## 2023-08-16 LAB
ALBUMIN SERPL-MCNC: 4.4 G/DL (ref 3.7–4.7)
CORTIS SERPL-MCNC: 6.7 UG/DL (ref 6.2–19.4)
DHEA-S SERPL-MCNC: <10 UG/DL
Lab: <10 NG/DL
Lab: NORMAL NG/DL
PROLACTIN SERPL-MCNC: 13.3 NG/ML (ref 4–15.2)
SHBG SERPL-SCNC: 58.5 NMOL/L (ref 19.3–76.4)
TESTOST SERPL-MCNC: <3 NG/DL (ref 264–916)
TESTOSTERONE, FREE, CALC: ABNORMAL PG/ML

## 2023-08-23 DIAGNOSIS — D35.2 PITUITARY MACROADENOMA (HCC): Primary | ICD-10-CM

## 2023-08-24 NOTE — RESULT ENCOUNTER NOTE
Please call the patient regarding labs - it look like some wrong tests were done   I had ordered prolactin by dilution and ACTH along with am cortisol   I am going to re order - please let him know and mail out to him   Please have them done morning and fasting 7-9 am

## 2023-08-25 NOTE — RESULT ENCOUNTER NOTE
Dr Kristen Montiel is out of the office. The results she reviewed do not relate to the sodium levels. The tests he had done for Dr. Kristen Montiel were not done properly.  He will need to get them while fasting and from 7a-9am.

## 2023-08-25 NOTE — RESULT ENCOUNTER NOTE
Dr Gela Soriano is out of the office. The results she reviewed do not relate to the sodium levels. The tests he had done for Dr. Gela Soriano were not done properly.  He will need to get them while fasting and from 7a-9am.

## 2023-09-12 ENCOUNTER — HOSPITAL ENCOUNTER (OUTPATIENT)
Dept: NON INVASIVE DIAGNOSTICS | Age: 83
Discharge: HOME | End: 2023-09-12
Payer: COMMERCIAL

## 2023-09-12 VITALS
DIASTOLIC BLOOD PRESSURE: 82 MMHG | WEIGHT: 175 LBS | BODY MASS INDEX: 25.05 KG/M2 | HEART RATE: 72 BPM | SYSTOLIC BLOOD PRESSURE: 130 MMHG | HEIGHT: 70 IN

## 2023-09-12 DIAGNOSIS — R55 SYNCOPE: ICD-10-CM

## 2023-09-12 LAB
AORTIC ROOT: 3.5 CM
ASCENDING AORTA: 3.2 CM
DOP CALC MV VTI: 23.14 CM
E WAVE DECELERATION TIME: 246 MS
FRACTIONAL SHORTENING: 35 (ref 28–44)
INTERVENTRICULAR SEPTUM IN DIASTOLE (PARASTERNAL SHORT AXIS VIEW): 1.2 CM
INTERVENTRICULAR SEPTUM: 1.2 CM (ref 0.6–1.1)
LAAS-AP2: 13.6 CM2
LAAS-AP4: 11.1 CM2
LEFT ATRIUM SIZE: 3.3 CM
LEFT ATRIUM VOLUME (MOD BIPLANE): 29 ML
LEFT INTERNAL DIMENSION IN SYSTOLE: 2 CM (ref 2.1–4)
LEFT VENTRICULAR INTERNAL DIMENSION IN DIASTOLE: 3.1 CM (ref 3.5–6)
LEFT VENTRICULAR POSTERIOR WALL IN END DIASTOLE: 0.9 CM
LEFT VENTRICULAR STROKE VOLUME: 26 ML
LVSV (TEICH): 26 ML
MV MEAN GRADIENT: 2 MMHG
MV PEAK A VEL: 0.83 M/S
MV PEAK E VEL: 75 CM/S
MV PEAK GRADIENT: 3 MMHG
MV STENOSIS PRESSURE HALF TIME: 71 MS
MV VALVE AREA P 1/2 METHOD: 3.1
SL CV LEFT ATRIUM LENGTH A2C: 4.3 CM
SL CV LV EF: 60
SL CV PED ECHO LEFT VENTRICLE DIASTOLIC VOLUME (MOD BIPLANE) 2D: 38 ML
SL CV PED ECHO LEFT VENTRICLE SYSTOLIC VOLUME (MOD BIPLANE) 2D: 12 ML

## 2023-09-12 PROCEDURE — 93306 TTE W/DOPPLER COMPLETE: CPT | Performed by: INTERNAL MEDICINE

## 2023-09-12 PROCEDURE — 93306 TTE W/DOPPLER COMPLETE: CPT

## 2024-01-30 LAB
ACTH PLAS-MCNC: 11.7 PG/ML (ref 7.2–63.3)
CORTIS AM PEAK SERPL-MCNC: 6.2 UG/DL (ref 6.2–19.4)
DHEA-S SERPL-MCNC: 3.7 UG/DL (ref 20.8–226.4)
PROLACTIN SERPL-MCNC: 15.9 NG/ML
TESTOST FREE SERPL-MCNC: <0.2 PG/ML (ref 6.6–18.1)
TESTOST SERPL-MCNC: <3 NG/DL (ref 264–916)

## 2024-02-12 ENCOUNTER — FOLLOW UP (OUTPATIENT)
Dept: URBAN - METROPOLITAN AREA CLINIC 6 | Facility: CLINIC | Age: 84
End: 2024-02-12

## 2024-02-12 DIAGNOSIS — D44.3: ICD-10-CM

## 2024-02-12 DIAGNOSIS — H02.831: ICD-10-CM

## 2024-02-12 DIAGNOSIS — H02.834: ICD-10-CM

## 2024-02-12 PROCEDURE — 92083 EXTENDED VISUAL FIELD XM: CPT

## 2024-02-12 PROCEDURE — 92012 INTRM OPH EXAM EST PATIENT: CPT

## 2024-02-12 ASSESSMENT — TONOMETRY
OD_IOP_MMHG: 18
OS_IOP_MMHG: 19

## 2024-02-12 ASSESSMENT — VISUAL ACUITY
OD_CC: 20/20-2
OS_CC: 20/20-1

## 2024-02-19 ENCOUNTER — HOSPITAL ENCOUNTER (OUTPATIENT)
Dept: MRI IMAGING | Facility: HOSPITAL | Age: 84
Discharge: HOME | End: 2024-02-19
Attending: NEUROLOGICAL SURGERY
Payer: COMMERCIAL

## 2024-02-19 DIAGNOSIS — E23.7 PITUITARY LESION (HCC): ICD-10-CM

## 2024-02-19 PROCEDURE — 70553 MRI BRAIN STEM W/O & W/DYE: CPT

## 2024-02-19 PROCEDURE — A9585 GADOBUTROL INJECTION: HCPCS | Performed by: NEUROLOGICAL SURGERY

## 2024-02-19 PROCEDURE — G1004 CDSM NDSC: HCPCS

## 2024-02-19 RX ORDER — GADOBUTROL 604.72 MG/ML
8 INJECTION INTRAVENOUS
Status: COMPLETED | OUTPATIENT
Start: 2024-02-19 | End: 2024-02-19

## 2024-02-19 RX ADMIN — GADOBUTROL 8 ML: 604.72 INJECTION INTRAVENOUS at 10:41

## 2024-02-21 PROBLEM — Z12.11 SCREENING FOR COLON CANCER: Status: RESOLVED | Noted: 2020-03-02 | Resolved: 2024-02-21

## 2024-03-08 ENCOUNTER — OFFICE VISIT (OUTPATIENT)
Dept: ENDOCRINOLOGY | Facility: CLINIC | Age: 84
End: 2024-03-08
Payer: COMMERCIAL

## 2024-03-08 VITALS
SYSTOLIC BLOOD PRESSURE: 132 MMHG | HEIGHT: 70 IN | WEIGHT: 179.2 LBS | BODY MASS INDEX: 25.65 KG/M2 | DIASTOLIC BLOOD PRESSURE: 82 MMHG

## 2024-03-08 DIAGNOSIS — R79.89 LOW SERUM CORTISOL LEVEL: ICD-10-CM

## 2024-03-08 DIAGNOSIS — E03.9 ACQUIRED HYPOTHYROIDISM: Chronic | ICD-10-CM

## 2024-03-08 DIAGNOSIS — E23.0 HYPOPITUITARISM (HCC): ICD-10-CM

## 2024-03-08 DIAGNOSIS — D35.2 PITUITARY MACROADENOMA (HCC): Primary | ICD-10-CM

## 2024-03-08 PROCEDURE — 99214 OFFICE O/P EST MOD 30 MIN: CPT | Performed by: INTERNAL MEDICINE

## 2024-03-08 NOTE — PROGRESS NOTES
Saleem Bass 83 y.o. 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  A.m. cortisol was initially slightly low and on repeat testing is on the lower end of normal.  He denies any symptoms of adrenal insufficiency however will do ACTH stimulation test to be on the safe side         Pituitary macroadenoma (HCC) - Primary     Stable on repeat imaging-patient reporting visual field exam did not show any visual field defects.  He does have some anterior pituitary hormonal deficits we are electing not to treat  He has upcoming appointment with neurosurgery            Other    Low serum cortisol level        CC:   Pituitary macroadenoma    History of Present Illness     HPI:  83-year-old male who was recently hospitalized for syncopal episode and during work-up was found to have a pituitary macroadenoma.    Recalls being told more than 10 years back that he had some pituitary abnormality on the imaging however was not advised to have any follow-up imaging    No headaches /vision changes -had repeat visual field exam done recently and was told that there are no visual field deficits  No fatigue   Weight gain a couple of lbs   Occasional constipation /diarrhea     Has vertigo   No dizziness    Has upcoming appointment with neurosurgery    Review of Systems    Historical Information   Past Medical History:   Diagnosis Date    Disease of thyroid gland     Hay fever     Melanoma (HCC)     Underneath foot     Past Surgical History:   Procedure Laterality Date    COLONOSCOPY  07/31/2009    COLONOSCOPY  07/31/2009    Hyperplastic colon polyp, mild diverticulosis in the sigmoid and internal hemorrhoids.  Ten  "year recall advised.     Social History   Social History     Substance and Sexual Activity   Alcohol Use Yes     Social History     Substance and Sexual Activity   Drug Use Never     Social History     Tobacco Use   Smoking Status Never   Smokeless Tobacco Never     Family History:   Family History   Problem Relation Age of Onset    Colon polyps Neg Hx     Colon cancer Neg Hx        Meds/Allergies   Current Outpatient Medications   Medication Sig Dispense Refill    levothyroxine 75 mcg tablet Take 1 tablet (75 mcg total) by mouth daily 30 tablet 0     No current facility-administered medications for this visit.     No Known Allergies    Objective   Vitals: Blood pressure 132/82, height 5' 10\" (1.778 m), weight 81.3 kg (179 lb 3.2 oz).    Physical Exam  Vitals reviewed.   Constitutional:       General: He is not in acute distress.     Appearance: Normal appearance. He is obese. He is not ill-appearing, toxic-appearing or diaphoretic.   HENT:      Head: Normocephalic and atraumatic.   Eyes:      General: No scleral icterus.     Extraocular Movements: Extraocular movements intact.   Cardiovascular:      Rate and Rhythm: Normal rate and regular rhythm.      Heart sounds: Normal heart sounds. No murmur heard.  Pulmonary:      Effort: Pulmonary effort is normal. No respiratory distress.      Breath sounds: Normal breath sounds. No wheezing or rales.   Musculoskeletal:      Cervical back: Neck supple.      Right lower leg: No edema.      Left lower leg: No edema.   Lymphadenopathy:      Cervical: No cervical adenopathy.   Skin:     General: Skin is warm and dry.   Neurological:      General: No focal deficit present.      Mental Status: He is alert and oriented to person, place, and time.      Gait: Gait normal.   Psychiatric:         Mood and Affect: Mood normal.         Behavior: Behavior normal.         Thought Content: Thought content normal.         Judgment: Judgment normal.         The history was obtained from the " "review of the chart, patient and family.    Lab Results:   Lab Results   Component Value Date/Time    Potassium 3.8 07/30/2023 04:43 AM    Potassium 3.9 07/29/2023 08:17 AM    Potassium 4.1 07/28/2023 09:50 AM    Chloride 102 07/30/2023 04:43 AM    Chloride 99 07/29/2023 08:17 AM    Chloride 95 (L) 07/28/2023 09:50 AM    CO2 27 07/30/2023 04:43 AM    CO2 26 07/29/2023 08:17 AM    CO2 29 07/28/2023 09:50 AM    BUN 17 07/30/2023 04:43 AM    BUN 15 07/29/2023 08:17 AM    BUN 17 07/28/2023 09:50 AM    Creatinine 0.89 07/30/2023 04:43 AM    Creatinine 0.98 07/29/2023 08:17 AM    Creatinine 1.02 07/28/2023 09:50 AM    Calcium 8.8 07/30/2023 04:43 AM    Calcium 9.0 07/29/2023 08:17 AM    Calcium 9.4 07/28/2023 09:50 AM    eGFR 80 07/30/2023 04:43 AM    eGFR 72 07/29/2023 08:17 AM    eGFR 68 07/28/2023 09:50 AM    TSH 3RD GENERATON 0.794 07/29/2023 08:17 AM    Free T4 0.84 07/29/2023 08:17 AM    Prolactin 15.9 (H) 01/25/2024 07:49 AM    Prolactin 13.3 08/04/2023 07:51 AM    Prolactin 10.45 07/29/2023 08:17 AM    Cortisol 6.7 08/04/2023 07:51 AM    Cortisol AM 6.2 01/25/2024 07:49 AM    Testosterone, Free <0.2 (L) 01/25/2024 07:49 AM    TESTOSTERONE, FREE, CALC CANCELED 08/04/2023 07:51 AM    FSH 4.4 07/30/2023 11:20 AM    LH 0.8 (L) 07/30/2023 11:20 AM             Imaging Studies:  Results for orders placed during the hospital encounter of 02/19/24    MRI brain pituitary wo and w contrast    Impression  No significant interval change. Stable enhancing sellar/suprasellar mass consistent with a pituitary macroadenoma and with abutment of the optic chiasm/prechiasmatic segment of the right optic nerve. No elaine cavernous sinus extension of disease  identified.    Workstation performed: [id number]         I have personally reviewed pertinent reports.      Portions of the record may have been created with voice recognition software. Occasional wrong word or \"sound a like\" substitutions may have occurred due to the inherent " limitations of voice recognition software. Read the chart carefully and recognize, using context, where substitutions have occurred.

## 2024-03-09 NOTE — ASSESSMENT & PLAN NOTE
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  A.m. cortisol was initially slightly low and on repeat testing is on the lower end of normal.  He denies any symptoms of adrenal insufficiency however will do ACTH stimulation test to be on the safe side

## 2024-03-09 NOTE — ASSESSMENT & PLAN NOTE
Stable on repeat imaging-patient reporting visual field exam did not show any visual field defects.  He does have some anterior pituitary hormonal deficits we are electing not to treat  He has upcoming appointment with neurosurgery

## 2024-03-14 ENCOUNTER — LAB (OUTPATIENT)
Dept: LAB | Facility: HOSPITAL | Age: 84
End: 2024-03-14
Payer: COMMERCIAL

## 2024-03-14 DIAGNOSIS — R79.89 HYPOURICEMIA: ICD-10-CM

## 2024-03-14 DIAGNOSIS — R79.89 LOW SERUM CORTISOL LEVEL: ICD-10-CM

## 2024-03-14 LAB
CORTIS SERPL-MCNC: 4.3 UG/DL
CORTIS SERPL-MCNC: 4.8 UG/DL

## 2024-03-14 PROCEDURE — 36415 COLL VENOUS BLD VENIPUNCTURE: CPT

## 2024-03-14 PROCEDURE — 82533 TOTAL CORTISOL: CPT

## 2024-03-14 PROCEDURE — 82024 ASSAY OF ACTH: CPT

## 2024-03-15 LAB — ACTH PLAS-MCNC: 7.9 PG/ML (ref 7.2–63.3)

## 2024-03-15 NOTE — RESULT ENCOUNTER NOTE
Please call the patient regarding labs - please check with  infusion center if the test was done the right way - first baseline cortisol is drawn after cosyntropin is given - cortisol is meant to be drawn at 30 mins and 60 mins  - in here there are 2 cortisol test at 10:35 am

## 2024-03-21 DIAGNOSIS — R79.89 LOW SERUM CORTISOL LEVEL: Primary | ICD-10-CM

## 2024-03-21 NOTE — RESULT ENCOUNTER NOTE
Do I need to re order ACTH stim test ? Or is the order still there and he can be scheduled at infusion center ?

## 2024-03-26 ENCOUNTER — TELEMEDICINE (OUTPATIENT)
Dept: NEUROSURGERY | Facility: CLINIC | Age: 84
End: 2024-03-26
Payer: COMMERCIAL

## 2024-03-26 ENCOUNTER — TELEPHONE (OUTPATIENT)
Dept: NEUROSURGERY | Facility: CLINIC | Age: 84
End: 2024-03-26

## 2024-03-26 DIAGNOSIS — E23.7 PITUITARY LESION (HCC): Primary | ICD-10-CM

## 2024-03-26 PROCEDURE — 99214 OFFICE O/P EST MOD 30 MIN: CPT | Performed by: NEUROLOGICAL SURGERY

## 2024-03-26 NOTE — PROGRESS NOTES
It was my intent to perform this visit via video technology but the patient was not able to do a video connection so the visit was completed via audio telephone only.

## 2024-03-26 NOTE — TELEPHONE ENCOUNTER
3/26/24:  PT SCHEDULED FOR MRI BRAIN [facility] 9/15/25.  WILL NEED SOMETHING FOR HIS ANXIETY.   PLEASE SEND TO HIS PHARM AROUND THE TIME OF HIS SCAN.  THANKSOSKAR

## 2024-03-26 NOTE — PROGRESS NOTES
"Virtual Regular Visit    Verification of patient location:    Patient is located at Home in the following state in which I hold an active license [place]      Assessment/Plan:    Problem List Items Addressed This Visit    Pituitary lesion           Reason for visit is   Chief Complaint   Patient presents with    Follow-up     MRI pituitary MRI done 2/19/24    Virtual Regular Visit          Encounter provider Danish Levi MD    Provider located at [facility] NEUROSURGICAL [facility]  [address]  [address]  [place] [address]      Recent Visits  No visits were found meeting these conditions.  Showing recent visits within past 7 days and meeting all other requirements  Today's Visits  Date Type Provider Dept   03/26/24 Telemedicine Danish Levi MD [facility]   Showing today's visits and meeting all other requirements  Future Appointments  No visits were found meeting these conditions.  Showing future appointments within next 150 days and meeting all other requirements       The patient was identified by name and date of birth. Saleem MEJIA Shelia was informed that this is a telemedicine visit and that the visit is being conducted through the [employer] platform. He agrees to proceed..  My office door was closed. No one else was in the room.  He acknowledged consent and understanding of privacy and security of the video platform. The patient has agreed to participate and understands they can discontinue the visit at any time.    Patient is aware this is a billable service.     Assessment/Plan:  Patient was previously evaluated on 8/8/23.    He is an 83-year-old male with h/o melanoma, HTN, hypothyroidism who was hospitalized on 7/28/2023 after transient syncopal episode, found incidentally to have a sellar mass during work-up. Of note, he and his wife knew about a \"pituitary lesion\" since 2013, when he was hospitalized at [place] on 1/14/13 for memory loss and " "confusion. MRI brain without contrast at that time reported \"mild prominence in size of pituitary gland\".    Not on AC/AP. Non-smoker.    Followed by Dr. Davey (Endocrinology).     MRI brain pituitary protocol repeated on 2/19/24 demonstrated a stable ~2 cm sellar mass with small lobular component extending suprasellar to the right, without cavernous sinus invasion or significant compression of optic chiasm or bilateral optic nerves (just abutting the undersurface of chiasm and right optic nerve), unchanged from prior MRI on 7/29/2023.     Today, he reports no new visual field deficits (\"there's nothing wrong with my vision\"), which was confirmed by prior ophthalmological evaluations. Also denies headache or any other neurologic symptoms or deficits.     Baseline pituitary labs obtained during prior hospitalization unremarkable.     At this time, I recommend continuing surveillance of stable sellar mass with repeat MRI brain pituitary protocol and follow-up (SNPX) in 1 year.     All questions and concerns were addressed during this visit.         HPI     Past Medical History:   Diagnosis Date    Disease of thyroid gland     Hay fever     Melanoma (HCC)     Underneath foot       Past Surgical History:   Procedure Laterality Date    COLONOSCOPY  07/31/2009    COLONOSCOPY  07/31/2009    Hyperplastic colon polyp, mild diverticulosis in the sigmoid and internal hemorrhoids.  Ten year recall advised.       Current Outpatient Medications   Medication Sig Dispense Refill    levothyroxine 75 mcg tablet Take 1 tablet (75 mcg total) by mouth daily 30 tablet 0     No current facility-administered medications for this visit.        No Known Allergies    Review of Systems   Constitutional:  Positive for fever (one episode of fever like feeling also with a burning sensation over entire body about 2 nights ago).   HENT: Negative.     Eyes: Negative.         Wears glasses   Respiratory: Negative.     Cardiovascular: Negative.  "   Gastrointestinal:  Positive for constipation.   Endocrine: Negative.    Genitourinary:  Positive for frequency (nocturia x1).   Musculoskeletal:  Positive for gait problem (only due to associated with episode today of dizziness).   Skin: Negative.    Allergic/Immunologic: Positive for environmental allergies (hay fever, mild).   Neurological:  Positive for dizziness. Negative for seizures, syncope (one episode due to low sodium), weakness, numbness and headaches.        MRI pituitary MRI done 2/19/24  VF & labs  under media   Not on AC/AP. Non-smoker.   Hematological: Negative.    Psychiatric/Behavioral: Negative.     All other systems reviewed and are negative.      Video Exam    There were no vitals filed for this visit.    Physical Exam  Vitals and nursing note reviewed.   Constitutional:       Appearance: Normal appearance. He is normal weight.   HENT:      Head: Normocephalic and atraumatic.   Eyes:      Extraocular Movements: Extraocular movements intact.      Pupils: Pupils are equal, round, and reactive to light.   Cardiovascular:      Rate and Rhythm: Normal rate and regular rhythm.      Pulses: Normal pulses.      Heart sounds: Normal heart sounds.   Pulmonary:      Effort: Pulmonary effort is normal.      Breath sounds: Normal breath sounds.   Abdominal:      General: Abdomen is flat.      Palpations: Abdomen is soft.   Musculoskeletal:         General: Normal range of motion.      Cervical back: Normal range of motion.   Neurological:      General: No focal deficit present.      Mental Status: He is alert and oriented to person, place, and time. Mental status is at baseline.      GCS: GCS eye subscore is 4. GCS verbal subscore is 5. GCS motor subscore is 6.      Sensory: Sensation is intact.      Motor: Motor function is intact.      Coordination: Coordination is intact.      Gait: Gait is intact.      Deep Tendon Reflexes: Reflexes are normal and symmetric.   Psychiatric:         Mood and Affect: Mood  normal.         Behavior: Behavior normal.     VFF on virtual exam     Visit Time  Total Visit Duration: 30 minutes

## 2024-04-10 DIAGNOSIS — R79.89 LOW SERUM CORTISOL LEVEL: Primary | ICD-10-CM

## 2024-04-15 ENCOUNTER — HOSPITAL ENCOUNTER (OUTPATIENT)
Dept: INFUSION CENTER | Facility: HOSPITAL | Age: 84
Discharge: HOME | End: 2024-04-15
Attending: INTERNAL MEDICINE
Payer: COMMERCIAL

## 2024-04-15 VITALS
HEART RATE: 74 BPM | DIASTOLIC BLOOD PRESSURE: 72 MMHG | RESPIRATION RATE: 18 BRPM | SYSTOLIC BLOOD PRESSURE: 149 MMHG | OXYGEN SATURATION: 96 % | TEMPERATURE: 96.4 F

## 2024-04-15 DIAGNOSIS — R79.89 LOW SERUM CORTISOL LEVEL: Primary | ICD-10-CM

## 2024-04-15 LAB
CORTIS SERPL-MCNC: 15.8 UG/DL
CORTIS SERPL-MCNC: 19.8 UG/DL
CORTIS SERPL-MCNC: 4.1 UG/DL

## 2024-04-15 PROCEDURE — 96374 THER/PROPH/DIAG INJ IV PUSH: CPT

## 2024-04-15 PROCEDURE — 82533 TOTAL CORTISOL: CPT | Performed by: INTERNAL MEDICINE

## 2024-04-15 PROCEDURE — 82024 ASSAY OF ACTH: CPT | Performed by: INTERNAL MEDICINE

## 2024-04-15 RX ADMIN — SODIUM CHLORIDE 0.25 MG: 9 INJECTION INTRAMUSCULAR; INTRAVENOUS; SUBCUTANEOUS at 08:34

## 2024-04-15 NOTE — PROGRESS NOTES
Saleem Bass  tolerated treatment well with no complications.      Saleem Bass has no future appts. Scheduled with infusion ctr. Pt. Has f/u appt. Scheduled 09/10/2024 at 01:00 PM with office.     AVS printed and given to Saleem Bass:  Yes

## 2024-04-16 ENCOUNTER — TELEPHONE (OUTPATIENT)
Dept: ENDOCRINOLOGY | Facility: CLINIC | Age: 84
End: 2024-04-16

## 2024-04-16 LAB — ACTH PLAS-MCNC: 9.1 PG/ML (ref 7.2–63.3)

## 2024-04-16 NOTE — TELEPHONE ENCOUNTER
----- Message from Clarissa Davey MD sent at 4/16/2024  9:24 AM EDT -----  Please call the patient regarding labs - ACTH stim test normal

## 2024-08-16 ENCOUNTER — OFFICE VISIT (OUTPATIENT)
Dept: URGENT CARE | Facility: CLINIC | Age: 84
End: 2024-08-16
Payer: COMMERCIAL

## 2024-08-16 VITALS
SYSTOLIC BLOOD PRESSURE: 140 MMHG | RESPIRATION RATE: 18 BRPM | DIASTOLIC BLOOD PRESSURE: 84 MMHG | OXYGEN SATURATION: 98 % | HEART RATE: 76 BPM | TEMPERATURE: 97.9 F
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
## 2024-08-16 DIAGNOSIS — L03.114 CELLULITIS OF LEFT UPPER EXTREMITY: ICD-10-CM

## 2024-08-16 PROCEDURE — 99213 OFFICE O/P EST LOW 20 MIN: CPT

## 2024-08-16 RX ORDER — PREDNISONE 10 MG/1
TABLET ORAL
Qty: 42 TABLET | Refills: 0 | Status: SHIPPED | OUTPATIENT
Start: 2024-08-16

## 2024-08-16 RX ORDER — CEPHALEXIN 500 MG/1
500 CAPSULE ORAL EVERY 8 HOURS SCHEDULED
Qty: 21 CAPSULE | Refills: 0 | Status: SHIPPED | OUTPATIENT
Start: 2024-08-16 | End: 2024-08-23

## 2024-08-16 NOTE — PATIENT INSTRUCTIONS
"Prescribed course of prednisone and Keflex, take medications as directed.  Recommend ice to the area, elevation.   Over the counter antihistamine such as Benadryl, Claritin, Zyrtec as directed on packaging.   Over the counter Tylenol as directed on packaging for pain.  You may utilize topical anti-itch creams such as hydrocortisone, Benadryl.    Follow up with PCP in 3-5 days.  Proceed to  ER if symptoms worsen.    If tests are performed, our office will contact you with results only if changes need to made to the care plan discussed with you at the visit. You can review your full results on [facility] Mychart.    Patient Education     Insect bites and stings   The Basics   Written by the doctors and editors at [facility]   How are insect bites and stings different? -- When an insect bites you, it uses its mouth parts. When an insect stings you, it uses a special \"stinger\" on the back of its body.   Biting insects, like mosquitoes and ticks, can transfer blood from other people and animals that they've bitten on to you. Some diseases and infections can be spread this way.   Stinging insects, like bees, wasps, and fire ants, do not usually carry disease. But stinging insects can inject you with \"venom.\" This can irritate your skin. Plus, a sting can be deadly to people who are severely allergic to the insect venom.  What is a normal reaction to an insect sting? -- Insect stings can cause the area around the sting to swell, turn red, hurt, and feel hot (picture 1).  To treat the pain and swelling around the area of the sting, you can:   Wash the area with soap and cool water.   Keep the area clean, and try not to scratch it.   Put a cold, damp washcloth on the area.   Take or apply anti-itch medicine.   Take a non-prescription pain medicine for the pain.  The reaction usually gets better in an hour or 2. But for some people, swelling around the sting can last for days. This is called a \"large local reaction.\" It is not " "dangerous, and it is not the same as an allergic reaction.  Some people do have a severe allergic reaction to insect stings. This is called \"anaphylaxis.\" Signs include hives, swelling, and trouble breathing.  What should I know about tick bites? -- Ticks are found in the grass and on shrubs, and can attach to people walking by. One type of tick can spread Lyme disease. But a tick has to stay attached for a while before it can give you the infection.  If you are bitten by a tick, gently remove the tick from your skin using tweezers. Save the tick by sealing it in a piece of clear tape. If you can't save it, try to remember its color and size. This can help your doctor or nurse figure out if it might be the type of tick that carries Lyme disease.  Call your doctor or nurse if:   You cannot remove a tick from yourself or your child.   You get a fever or rash within the next few weeks.   You think that you have had a tick attached for at least 36 hours (a day and a half).  Your doctor or nurse can then decide if you need to take a dose of an antibiotic to help prevent Lyme. Doctors only recommend antibiotics to prevent Lyme disease in some situations. It depends on your age, where you live, what kind of tick bit you, and how long it was attached.  What can I do to lower my chances of getting bitten or stung? -- You can:   Wear shoes, long-sleeved shirts, and long pants when you go outside. If you are worried about ticks, tuck your pants into your socks and wear light colors so you can spot any ticks that get on you.   Wear bug spray.   Stay inside at nyla and dusk, when mosquitoes are most active.   Keep your windows closed. If you do open them, make sure that they have screens.   Drain areas of standing water near your home, such as wading pools and buckets. Mosquitoes breed in standing water.   Keep foods and drinks covered when you are outside.   If you see a stinging insect, stay calm and slowly back away.   If you " live in an area that has fire ants, avoid stepping on ant mounds.   If you find an insect nest in or near your house, call a pest control service to get rid of the nest safely.   Treat your pets and home for fleas, if needed. Ask your pet's  for advice on how to do this.  What should I do if I am stung by a bee, wasp, or fire ant? -- If you are stung by a bee or wasp, quickly remove the stinger from your skin if it is still there. If you are stung by a fire ant, kill the ant with a slap as soon as you feel the sting.  Call for an ambulance (in the [place] and [place], call 9-1-1) if you:   Have trouble breathing, become hoarse, or start wheezing (hearing a whistling sound when you breathe)   Start to swell, especially around the face, eyelids, ears, mouth, hands, or feet   Have belly cramps, nausea, vomiting, or diarrhea   Feel dizzy or pass out  All topics are updated as new evidence becomes available and our peer review process is complete.  This topic retrieved from [employer] on: Feb 26, 2024.  Topic 73788 Version 12.0  Release: 32.2.4 - C32.56  © 2024 UpToDate, Inc. and/or its affiliates. All rights reserved.  picture 1: Insect sting     This is a normal reaction to a bee or wasp sting. There can be redness and mild, painful swelling around the spot where the stinger went in. These symptoms usually go away within a few hours.  Graphic 77322 Version 4.0  Consumer Information Use and Disclaimer   Disclaimer: This generalized information is a limited summary of diagnosis, treatment, and/or medication information. It is not meant to be comprehensive and should be used as a tool to help the user understand and/or assess potential diagnostic and treatment options. It does NOT include all information about conditions, treatments, medications, side effects, or risks that may apply to a specific patient. It is not intended to be medical advice or a substitute for the medical advice, diagnosis, or treatment of a  health care provider based on the health care provider's examination and assessment of a patient's specific and unique circumstances. Patients must speak with a health care provider for complete information about their health, medical questions, and treatment options, including any risks or benefits regarding use of medications. This information does not endorse any treatments or medications as safe, effective, or approved for treating a specific patient. UpToDate, Inc. and its affiliates disclaim any warranty or liability relating to this information or the use thereof.The use of this information is governed by the Terms of Use, available at https://www.One Season.Brndstr/en/know/clinical-effectiveness-terms. 2024© UpToDate, Inc. and its affiliates and/or licensors. All rights reserved.  Copyright   © 2024 UpToDate, Inc. and/or its affiliates. All rights reserved.    Patient Education     Cellulitis and erysipelas (skin infections)   The Basics   Written by the doctors and editors at [facility]   What are cellulitis and erysipelas? -- Cellulitis and erysipelas are both infections of the skin. These infections can cause redness, pain, and swelling. The difference between them is that erysipelas tends to affect the upper layers of skin, and cellulitis tends to affect deep layers of skin and sometimes the fat under the skin.  Cellulitis and erysipelas can happen when germs get into the skin. Normally, different types of germs live on a person's skin. Most of the time, these germs do not cause any problems. But if a person gets a cut or a break in the skin, the germs can get into the skin and cause an infection.  Certain conditions can increase a person's chance of getting cellulitis or erysipelas. These include:   Having a cut (even a tiny one)   Having another type of skin infection or a long-term skin condition   Having swelling of the skin or swelling in the body   Being overweight  What are the symptoms of cellulitis  "and erysipelas? -- Both types of infection cause very similar symptoms. Either infection can cause the infected area to be painful, red, swollen, or warm. Some people with cellulitis or erysipelas can sometimes also have fever or chills. And sometimes, people with these infections have no symptoms or only some of these symptoms.  Most of the time, cellulitis and erysipelas happen on the legs or arms. But people can get these infections in other places, such as the belly, face, in the mouth, or around the anus.  Will I need tests? -- Most people do not need any tests. Your doctor or nurse will do an exam and look at your skin.  It's important for a doctor or nurse to do an exam to figure out what kind of infection you have. The right treatment for a skin infection depends on the type of infection it is and which germs are causing it. Your doctor or nurse might need to do tests to figure out the cause of your infection.  If you have cellulitis or erysipelas, it's important to get treated. These infections can spread to the whole body and become serious if not treated.  How are cellulitis and erysipelas treated? -- These infections are treated with antibiotic pills. If your doctor prescribes medicine for you to take at home, it is important to follow the directions exactly. Take all of the pills you are given, even if you feel better before you finish them. If you do not take all the pills, the infection can come back worse.  People who have severe infections might be treated in the hospital and given antibiotics through a thin tube that goes into a vein, called an \"IV\".  What can I do to help treat my infection? -- You can:   Raise your arm or leg (if your infection is on your arm or leg) to reduce swelling - Raise the arm or leg up above the level of your heart 3 or 4 times a day, for 30 minutes each time.   Keep the infected area clean and dry - You can take a shower or bath, but be sure to pat the area dry with a " towel afterward.  Antibiotic ointments or creams do not work to treat cellulitis and erysipelas.  Should I call my doctor or nurse? -- You should call your doctor or nurse if your symptoms do not get better within 3 days of starting treatment. You should also call if the affected area gets:   Bigger   More swollen   More painful  Your doctor or nurse might do another exam or tests to see if you need different medicines.  Can skin infections be prevented? -- Sometimes. If you cut your skin, make sure to wash the area well with soap and water. This can help prevent the area from getting infected. If you have a long-term skin condition, ask your doctor or nurse what you can do to help prevent infections.  All topics are updated as new evidence becomes available and our peer review process is complete.  This topic retrieved from [employer] on: Feb 26, 2024.  Topic 59228 Version 15.0  Release: 32.2.4 - C32.56  © 2024 UpToDate, Inc. and/or its affiliates. All rights reserved.  Consumer Information Use and Disclaimer   Disclaimer: This generalized information is a limited summary of diagnosis, treatment, and/or medication information. It is not meant to be comprehensive and should be used as a tool to help the user understand and/or assess potential diagnostic and treatment options. It does NOT include all information about conditions, treatments, medications, side effects, or risks that may apply to a specific patient. It is not intended to be medical advice or a substitute for the medical advice, diagnosis, or treatment of a health care provider based on the health care provider's examination and assessment of a patient's specific and unique circumstances. Patients must speak with a health care provider for complete information about their health, medical questions, and treatment options, including any risks or benefits regarding use of medications. This information does not endorse any treatments or medications as safe,  effective, or approved for treating a specific patient. UpToDate, Inc. and its affiliates disclaim any warranty or liability relating to this information or the use thereof.The use of this information is governed by the Terms of Use, available at https://www.wolThe Sea Appuwer.com/en/know/clinical-effectiveness-terms. 2024© [employer], Inc. and its affiliates and/or licensors. All rights reserved.  Copyright   © 2024 [employer]. and/or its affiliates. All rights reserved.

## 2024-09-04 DIAGNOSIS — F40.240 CLAUSTROPHOBIA: Primary | ICD-10-CM

## 2024-09-04 RX ORDER — ALPRAZOLAM 0.5 MG
0.5 TABLET ORAL
Qty: 2 TABLET | Refills: 0 | Status: SHIPPED | OUTPATIENT
Start: 2024-09-04

## 2024-10-01 ENCOUNTER — OFFICE VISIT (OUTPATIENT)
Dept: ENDOCRINOLOGY | Facility: CLINIC | Age: 84
End: 2024-10-01
Payer: COMMERCIAL

## 2024-10-01 VITALS
DIASTOLIC BLOOD PRESSURE: 82 MMHG | SYSTOLIC BLOOD PRESSURE: 138 MMHG | WEIGHT: 179.4 LBS | HEART RATE: 78 BPM | OXYGEN SATURATION: 98 % | HEIGHT: 70 IN | BODY MASS INDEX: 25.68 KG/M2

## 2024-10-01 DIAGNOSIS — E03.9 ACQUIRED HYPOTHYROIDISM: Chronic | ICD-10-CM

## 2024-10-01 DIAGNOSIS — D35.2 PITUITARY MACROADENOMA (HCC): Primary | ICD-10-CM

## 2024-10-01 PROCEDURE — 99213 OFFICE O/P EST LOW 20 MIN: CPT

## 2024-10-01 NOTE — ASSESSMENT & PLAN NOTE
ACTH stimulation test completed 04/2024 with normal results  GH & testosterone deficiency- no treatment recommended at this time due to age  Continue to follow with Dr. Sharma (opthalmology) & Dr. Levi (neurosurgery)  Notify office for any headaches, vision changes, symptoms of adrenal insufficiency which were reviewed today  Continue hypothyroidism managed with PCP

## 2024-10-29 ENCOUNTER — TELEPHONE (OUTPATIENT)
Age: 84
End: 2024-10-29

## 2024-10-29 NOTE — TELEPHONE ENCOUNTER
Patient called stating for the past week he has been experiencing extreme fatigue, he is craving salt and feels his BP is low at times. He thinks this is due to low cortisol.  Please advise

## 2024-10-31 DIAGNOSIS — R79.89 LOW SERUM CORTISOL LEVEL: ICD-10-CM

## 2024-10-31 DIAGNOSIS — E23.0 HYPOPITUITARISM (HCC): Primary | ICD-10-CM

## 2024-10-31 DIAGNOSIS — D35.2 PITUITARY MACROADENOMA (HCC): Primary | ICD-10-CM

## 2024-10-31 NOTE — TELEPHONE ENCOUNTER
Please advise  Stim test was normal  Would you trial hydrocortisone ? Or send to PCP for alternative causes

## 2024-10-31 NOTE — TELEPHONE ENCOUNTER
Patient calling again regarding concerns of fatigue, salt craving and low BP. Please advise, thank you.

## 2024-10-31 NOTE — TELEPHONE ENCOUNTER
Patient aware and will go tomorrow. [facility] [place]

## 2024-11-02 LAB — CORTIS AM PEAK SERPL-MCNC: 4.1 UG/DL (ref 6.2–19.4)

## 2024-11-05 ENCOUNTER — TELEPHONE (OUTPATIENT)
Age: 84
End: 2024-11-05

## 2024-11-05 DIAGNOSIS — R79.89 LOW SERUM CORTISOL LEVEL: ICD-10-CM

## 2024-11-05 DIAGNOSIS — D35.2 PITUITARY MACROADENOMA (HCC): Primary | ICD-10-CM

## 2024-11-05 RX ORDER — HYDROCORTISONE 5 MG/1
TABLET ORAL
Qty: 90 TABLET | Refills: 1 | Status: SHIPPED | OUTPATIENT
Start: 2024-11-05

## 2024-11-05 NOTE — TELEPHONE ENCOUNTER
Please let patient know cortisol level is low. He has adrenal insufficiency (when the amount of cortisol produced is not sufficient for the body to undergo its normal processes). 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 We will start hydrocortisone 10 mg in the morning and 5 mg in the afternoon ~ 4 PM    Would advise sooner follow up in the office, 2-3 months with Dr. Davey      Please ensure he is aware of the following in the meantime.     ADRENAL INSUFFICIENCY: INSTRUCTIONS FOR SICK DAYS OR PROCEDURES    Normally functioning glands produce more glucocorticoid hormone (cortisol) when the body is under the physical stress of fever (over 100 degrees Fahrenheit), infection, surgery, vomiting, or diarrhea. In these situations, it is important to drink plenty of sugar and salt containing fluids to prevent dehydration or low blood sugar (unless you have diabetes).    The fact that you have adrenal insufficiency means that your body cannot deal with these stresses by making more cortisol. If you are sick with a fever (over 100 degrees Fahrenheit), infection, vomiting, or diarrhea you must triple your hydrocortisone doses (based on how ill you feel) and you should call your local doctor right away.     Similarly, if you are undergoing a planned medical procedure (such as minor surgery, gastroscopy, colonoscopy, bronchoscopy etc.) or major dental procedure (tooth extraction, root canal), please double your hydrocortisone dose the day before, the day of, and the day after your procedure. For major surgeries you must discuss this issue with your endocrinologist, surgeon and alert the anesthesiologist about your diagnosis.    For these reasons, you should ALWAYS wear a med Alert bracelet or necklace that states that you have adrenal insufficiency, and you should tell about your condition to all medical providers.    What if I am so ill that I cannot take my medication?  If you are too ill to take your pills, or  you cannot keep them down (i.e. vomiting), you must take a glucocorticoid medicine by injection (hydrocortisone 100mg once). For this you should urgently go to the nearest hospital emergency room (or carry an injectable vial if you are going to be far from a medical facility, such as during a camping trip, boat trip or travel to remote areas).    Instructions for how to inject hydrocortisone:  https://www.addisoncrisis.info/emergency-injection/emergency-injection-cortico-steroids-solu-cortef-act-o-vial-two-chamber-ampul/    Video Instructions  Https://www.CouchOne.com/watch?v=ehMuFDW4MxZ    How much medicine should I take once I feel better?  As soon as your illness is over and the symptoms are gone (for example, fever, vomiting, diarrhea), you can go back to your regular doses of hydrocortisone.

## 2024-11-13 NOTE — TELEPHONE ENCOUNTER
Patient notified. Patient is asking if it is possible to only take the 10mg in the morning and skip the afternoon pill due to not sleeping well

## 2024-12-16 DIAGNOSIS — D35.2 PITUITARY MACROADENOMA (HCC): ICD-10-CM

## 2024-12-16 DIAGNOSIS — R79.89 LOW SERUM CORTISOL LEVEL: ICD-10-CM

## 2024-12-19 DIAGNOSIS — D35.2 PITUITARY MACROADENOMA (HCC): ICD-10-CM

## 2024-12-19 DIAGNOSIS — R79.89 LOW SERUM CORTISOL LEVEL: ICD-10-CM

## 2024-12-19 RX ORDER — HYDROCORTISONE 5 MG/1
TABLET ORAL
Qty: 90 TABLET | Refills: 0 | Status: SHIPPED | OUTPATIENT
Start: 2024-12-19 | End: 2024-12-19

## 2024-12-19 RX ORDER — HYDROCORTISONE 5 MG/1
TABLET ORAL
Qty: 90 TABLET | Refills: 2 | Status: SHIPPED | OUTPATIENT
Start: 2024-12-19

## 2025-01-03 ENCOUNTER — TELEPHONE (OUTPATIENT)
Age: 85
End: 2025-01-03

## 2025-01-07 DIAGNOSIS — E23.0 HYPOPITUITARISM (HCC): ICD-10-CM

## 2025-01-07 DIAGNOSIS — E03.9 ACQUIRED HYPOTHYROIDISM: ICD-10-CM

## 2025-01-07 DIAGNOSIS — D35.2 PITUITARY MACROADENOMA (HCC): Primary | ICD-10-CM

## 2025-01-15 DIAGNOSIS — R79.89 LOW SERUM CORTISOL LEVEL: ICD-10-CM

## 2025-01-15 DIAGNOSIS — D35.2 PITUITARY MACROADENOMA (HCC): ICD-10-CM

## 2025-01-16 ENCOUNTER — RESULTS FOLLOW-UP (OUTPATIENT)
Dept: ENDOCRINOLOGY | Facility: CLINIC | Age: 85
End: 2025-01-16

## 2025-01-16 LAB
ACTH PLAS-MCNC: 9.8 PG/ML (ref 7.2–63.3)
CORTIS AM PEAK SERPL-MCNC: 4.7 UG/DL (ref 6.2–19.4)
FSH SERPL-ACNC: 6.4 MIU/ML (ref 1.5–12.4)
LH SERPL-ACNC: 1.7 MIU/ML (ref 1.7–8.6)
PROLACTIN SERPL-MCNC: 13.8 NG/ML (ref 3.6–32)
TESTOST FREE SERPL-MCNC: <0.2 PG/ML (ref 6.6–18.1)
TESTOST SERPL-MCNC: 3 NG/DL (ref 264–916)

## 2025-01-17 RX ORDER — HYDROCORTISONE 5 MG/1
TABLET ORAL
Qty: 360 TABLET | Refills: 2 | Status: SHIPPED | OUTPATIENT
Start: 2025-01-17

## 2025-02-17 ENCOUNTER — ESTABLISHED COMPREHENSIVE EXAM (OUTPATIENT)
Dept: URBAN - METROPOLITAN AREA CLINIC 6 | Facility: CLINIC | Age: 85
End: 2025-02-17

## 2025-02-17 DIAGNOSIS — D44.3: ICD-10-CM

## 2025-02-17 DIAGNOSIS — H02.831: ICD-10-CM

## 2025-02-17 DIAGNOSIS — H02.834: ICD-10-CM

## 2025-02-17 DIAGNOSIS — Z96.1: ICD-10-CM

## 2025-02-17 PROCEDURE — 92083 EXTENDED VISUAL FIELD XM: CPT

## 2025-02-17 PROCEDURE — 92014 COMPRE OPH EXAM EST PT 1/>: CPT

## 2025-02-17 ASSESSMENT — VISUAL ACUITY
OD_CC: 20/20
OS_CC: 20/25

## 2025-02-17 ASSESSMENT — TONOMETRY
OD_IOP_MMHG: 15
OS_IOP_MMHG: 15

## 2025-02-26 DIAGNOSIS — D35.2 PITUITARY MACROADENOMA (HCC): ICD-10-CM

## 2025-02-26 DIAGNOSIS — R79.89 LOW SERUM CORTISOL LEVEL: ICD-10-CM

## 2025-02-27 DIAGNOSIS — R79.89 LOW SERUM CORTISOL LEVEL: ICD-10-CM

## 2025-02-27 DIAGNOSIS — D35.2 PITUITARY MACROADENOMA (HCC): ICD-10-CM

## 2025-02-27 RX ORDER — HYDROCORTISONE 5 MG/1
TABLET ORAL
Qty: 360 TABLET | Refills: 0 | Status: SHIPPED | OUTPATIENT
Start: 2025-02-27 | End: 2025-02-27 | Stop reason: SDUPTHER

## 2025-02-27 RX ORDER — HYDROCORTISONE 5 MG/1
TABLET ORAL
Qty: 360 TABLET | Refills: 0 | Status: SHIPPED | OUTPATIENT
Start: 2025-02-27 | End: 2025-03-01 | Stop reason: SDUPTHER

## 2025-03-01 DIAGNOSIS — D35.2 PITUITARY MACROADENOMA (HCC): ICD-10-CM

## 2025-03-01 DIAGNOSIS — R79.89 LOW SERUM CORTISOL LEVEL: ICD-10-CM

## 2025-03-04 RX ORDER — HYDROCORTISONE 5 MG/1
TABLET ORAL
Qty: 360 TABLET | Refills: 0 | Status: SHIPPED | OUTPATIENT
Start: 2025-03-04

## 2025-03-27 ENCOUNTER — OFFICE VISIT (OUTPATIENT)
Dept: ENDOCRINOLOGY | Facility: CLINIC | Age: 85
End: 2025-03-27
Payer: COMMERCIAL

## 2025-03-27 VITALS
HEART RATE: 81 BPM | BODY MASS INDEX: 26.45 KG/M2 | SYSTOLIC BLOOD PRESSURE: 138 MMHG | DIASTOLIC BLOOD PRESSURE: 80 MMHG | WEIGHT: 184.8 LBS | HEIGHT: 70 IN

## 2025-03-27 DIAGNOSIS — E03.9 ACQUIRED HYPOTHYROIDISM: Primary | ICD-10-CM

## 2025-03-27 PROCEDURE — 99214 OFFICE O/P EST MOD 30 MIN: CPT

## 2025-03-27 NOTE — PROGRESS NOTES
Name: Saleem Bass      : 1940      MRN: [id number]  Encounter Provider: JOSE Tillman  Encounter Date: 3/27/2025   Encounter department: [facility] [place]    Chief Complaint   Patient presents with    Pituitary Problem     History of Present Illness   History of Present Illness  The patient is an 84-year-old male being seen for a follow-up of pituitary macroadenoma. He is currently following with neurosurgery, Dr. Levi. Last MRI completed in 2024 showed macroadenoma was stable. He has a repeat MRI scheduled for September 15, 2025. He takes hydrocortisone 10 mg every morning. For hypothyroidism, he is taking 75 mcg of levothyroxine daily. He is due for repeat thyroid lab work, which will be ordered today.    He reports an improvement in his condition with the use of hydrocortisone, noting the absence of previous attacks. His current regimen includes 2 tablets of 5 mg in the morning and none in the afternoon. He expresses frustration over a weight gain of 25% of the 40 pounds he had previously lost, attributing this to an increased appetite. He recalls experiencing severe dizziness and difficulty standing during episodes of low cortisol levels prior to starting medication, but these symptoms have not recurred.    He is on a daily dose of 75 mcg of levothyroxine for hypothyroidism, as prescribed by his primary care physician. He has been on this medication for approximately 3 years.    He continues to see Dr. Levi regularly and has an MRI scheduled for 2025. He recently consulted an ophthalmologist who advised a follow-up visit next year. He reports no changes in his vision or any peripheral issues. He also reports no headaches.    MEDICATIONS  Hydrocortisone, levothyroxine    Assessment & Plan  1. Pituitary macroadenoma.  The patient's blood pressure readings are within the normal range. His visual field remains unremarkable, and  he reports no headaches. The most recent blood work indicates an improvement in cortisol levels, although they remain suboptimal. Sodium levels are within the normal range. He is advised to maintain his current regimen of hydrocortisone, taking 2 tablets in the morning. A repeat MRI is scheduled for September 15, 2025, to monitor the stability of the macroadenoma. He is advised to continue follow-ups with the neurosurgeon, Dr. Levi, especially if the adenoma increases in size or affects vision.    2. Hypothyroidism.  The patient's TSH and T4 levels were within the normal range during the last evaluation in December 2024. He is advised to continue his current dosage of levothyroxine at 75 mcg daily. Repeat thyroid lab work will be ordered today to monitor T4 levels, as TSH may not be reliable due to the pituitary macroadenoma.    3. Weight gain.  The patient reports gaining back 25% of the 40 pounds he previously lost. He attributes this to increased hunger and food intake. It was explained that the weight gain is not due to hydrocortisone, as it is replacing cortisol that should naturally be produced. He is advised to manage his weight through diet and exercise.    Follow-up  The patient will follow up in 6 months.      Pertinent Medical History   Pituitary macroadenoma found during hospitalization for syncopal episode- over 10 years ago. Most recent MRI showed adenoma is stable. It does abut along optic chiasm but is overall stable.   ACTH stimulation test completed 04/2024 with normal results. Continued to present with symptoms of adrenal insufficiency. AM cortisol low.   GH & testosterone deficiency- no treatment recommended at this time due to age  Continue to follow with Dr. Sharma (opthalmology) & Dr. Levi (neurosurgery)         Review of Systems   Constitutional:  Negative for chills and fever.   HENT:  Negative for ear pain and sore throat.    Eyes:  Negative for pain and visual disturbance.   Respiratory:   "Negative for cough and shortness of breath.    Cardiovascular:  Negative for chest pain and palpitations.   Gastrointestinal:  Negative for abdominal pain and vomiting.   Genitourinary:  Negative for dysuria and hematuria.   Musculoskeletal:  Negative for arthralgias and back pain.   Skin:  Negative for color change and rash.   Neurological:  Negative for seizures and syncope.   All other systems reviewed and are negative.   as per HPI       Medical History Reviewed by provider this encounter:     .    Objective   /80   Pulse 81   Ht 5' 10\" (1.778 m)   Wt 83.8 kg (184 lb 12.8 oz)   BMI 26.52 kg/m²      Body mass index is 26.52 kg/m².  Wt Readings from Last 3 Encounters:   03/27/25 83.8 kg (184 lb 12.8 oz)   10/01/24 81.4 kg (179 lb 6.4 oz)   03/08/24 81.3 kg (179 lb 3.2 oz)     Physical Exam  Vitals reviewed.   HENT:      Head: Normocephalic and atraumatic.   Cardiovascular:      Rate and Rhythm: Normal rate.   Pulmonary:      Effort: Pulmonary effort is normal.   Neurological:      Mental Status: He is alert.       Physical Exam  Vital Signs  Blood pressure is normal.    Physical Exam      Results  Laboratory Studies  Cortisol level improved but still low. TSH and T4 were normal in December 2024. Sodium level is normal.    Imaging  Last MRI completed in February 2024 showed pituitary macroadenoma was stable.  Labs:   No results found for: \"HGBA1C\"  Lab Results   Component Value Date    CREATININE 0.89 07/30/2023    CREATININE 0.98 07/29/2023    CREATININE 1.02 07/28/2023    BUN 17 07/30/2023    K 3.8 07/30/2023     07/30/2023    CO2 27 07/30/2023     eGFR   Date Value Ref Range Status   07/30/2023 80 ml/min/1.73sq m Final     No results found for: \"CHOL\", \"HDL\", \"LDL\", \"TRIG\", \"CHOLHDL\"  Lab Results   Component Value Date    ALT 8 07/28/2023    AST 19 07/28/2023    ALKPHOS 57 07/28/2023     Lab Results   Component Value Date    ZYS2KRECHDXR 0.794 07/29/2023     Lab Results   Component Value Date    " FREET4 0.84 07/29/2023       There are no Patient Instructions on file for this visit.    Discussed with the patient and all questioned fully answered. He will call me if any problems arise.

## 2025-04-16 LAB
T4 FREE SERPL DIALY-MCNC: 1 NG/DL
TSH SERPL-ACNC: 0.51 UU/ML

## 2025-04-17 ENCOUNTER — RESULTS FOLLOW-UP (OUTPATIENT)
Dept: ENDOCRINOLOGY | Facility: CLINIC | Age: 85
End: 2025-04-17

## 2025-07-09 ENCOUNTER — TELEPHONE (OUTPATIENT)
Dept: ENDOCRINOLOGY | Facility: CLINIC | Age: 85
End: 2025-07-09

## 2025-07-09 LAB
ACTH PLAS-MCNC: 7 PG/ML (ref 7.2–63.3)
CORTIS AM PEAK SERPL-MCNC: 3.3 UG/DL (ref 6.2–19.4)
FSH SERPL-ACNC: 6 MIU/ML (ref 1.5–12.4)
LH SERPL-ACNC: 1.7 MIU/ML (ref 1.7–8.6)
PROLACTIN SERPL-MCNC: 12 NG/ML (ref 3.6–32)
T4 SERPL-MCNC: 5.8 UG/DL (ref 4.5–12)
TESTOST FREE SERPL-MCNC: <0.2 PG/ML (ref 6.6–18.1)
TESTOST SERPL-MCNC: <3 NG/DL (ref 264–916)

## 2025-07-11 ENCOUNTER — PATIENT MESSAGE (OUTPATIENT)
Dept: ENDOCRINOLOGY | Facility: CLINIC | Age: 85
End: 2025-07-11

## 2025-07-11 ENCOUNTER — TELEPHONE (OUTPATIENT)
Dept: ENDOCRINOLOGY | Facility: CLINIC | Age: 85
End: 2025-07-11

## 2025-07-11 DIAGNOSIS — R79.89 LOW SERUM CORTISOL LEVEL: ICD-10-CM

## 2025-07-11 DIAGNOSIS — D35.2 PITUITARY MACROADENOMA (HCC): ICD-10-CM

## 2025-07-11 NOTE — TELEPHONE ENCOUNTER
Spoke with patient regarding low ACTH and low am cortisol.  Reports feeling well, improved energy levels since starting 10 mg daily.  Advise he start taking 5 mg hydrocortisone in the afternoon (currently taking 10 mg in the morning)  We will repeat labs in 3-4 months prior to next appointment.   He knows to hold afternoon dose day prior to completing lab work.

## 2025-08-18 ENCOUNTER — NURSE TRIAGE (OUTPATIENT)
Age: 85
End: 2025-08-18

## 2025-08-19 DIAGNOSIS — D35.2 PITUITARY MACROADENOMA (HCC): ICD-10-CM

## 2025-08-19 DIAGNOSIS — R79.89 LOW SERUM CORTISOL LEVEL: ICD-10-CM

## 2025-08-20 RX ORDER — HYDROCORTISONE 5 MG/1
TABLET ORAL
Qty: 360 TABLET | Refills: 0 | Status: SHIPPED | OUTPATIENT
Start: 2025-08-20 | End: 2025-08-27